# Patient Record
Sex: MALE | Race: WHITE | NOT HISPANIC OR LATINO | Employment: FULL TIME | URBAN - METROPOLITAN AREA
[De-identification: names, ages, dates, MRNs, and addresses within clinical notes are randomized per-mention and may not be internally consistent; named-entity substitution may affect disease eponyms.]

---

## 2017-05-23 ENCOUNTER — NON-PROVIDER VISIT (OUTPATIENT)
Dept: OCCUPATIONAL MEDICINE | Facility: CLINIC | Age: 27
End: 2017-05-23

## 2017-05-23 DIAGNOSIS — Z02.1 PRE-EMPLOYMENT DRUG SCREENING: ICD-10-CM

## 2017-05-23 LAB
AMP AMPHETAMINE: NORMAL
COC COCAINE: NORMAL
INT CON NEG: NORMAL
INT CON POS: NORMAL
MET METHAMPHETAMINES: NORMAL
OPI OPIATES: NORMAL
PCP PHENCYCLIDINE: NORMAL
POC DRUG COMMENT 753798-OCCUPATIONAL HEALTH: NORMAL
THC: NORMAL

## 2017-05-23 PROCEDURE — 80305 DRUG TEST PRSMV DIR OPT OBS: CPT | Performed by: PREVENTIVE MEDICINE

## 2017-05-23 NOTE — MR AVS SNAPSHOT
Delfino Swartz   2017 3:50 PM   Non-Provider Visit   MRN: [id number]    Department:  [facility]   Dept Phone:  [phone]    Description:  Male : 1990   Provider:  VIDHYA HALEY MA           Reason for Visit     Drug / Alcohol Assessment [email or web address] Pre-employment DS       Allergies as of 2017     No Known Allergies      You were diagnosed with     Pre-employment drug screening   [970228]         Basic Information     Date Of Birth Sex Race Ethnicity Preferred Language    1990 Male White Non- English      Health Maintenance        Date Due Completion Dates    IMM HEP B VACCINE (1 of 3 - Primary Series) 1990 ---    IMM HEP A VACCINE (1 of 2 - Standard Series) 10/16/1993 ---    IMM HPV VACCINE (1 of 3 - Male 3 Dose Series) 10/16/2003 ---    IMM VARICELLA (CHICKENPOX) VACCINE (1 of 2 - 2 Dose Adolescent Series) 10/16/2005 ---    IMM DTaP/Tdap/Td Vaccine (1 - Tdap) 10/16/2011 ---            Results     POCT 6 Panel Urine Drug Screen      Component    AMPHETAMINE    POC THC    COCAINE    OPIATES    PHENCYCLIDINE    METHAMPHETAMINES    POC Urine Drug Screen Comment    NEG    Internal Control Positive    Valid    Internal Control Negative    Valid                        Current Immunizations     No immunizations on file.      Below and/or attached are the medications your provider expects you to take. Review all of your home medications and newly ordered medications with your provider and/or pharmacist. Follow medication instructions as directed by your provider and/or pharmacist. Please keep your medication list with you and share with your provider. Update the information when medications are discontinued, doses are changed, or new medications (including over-the-counter products) are added; and carry medication information at all times in the event of emergency situations     Allergies:  No Known Allergies          Medications  Valid as of: May 23, 2017 -  4:18 PM    Generic Name Brand Name Tablet Size Instructions for use    .                 Medicines prescribed today were sent to:     None      Medication refill instructions:       If your prescription bottle indicates you have medication refills left, it is not necessary to call your provider’s office. Please contact your pharmacy and they will refill your medication.    If your prescription bottle indicates you do not have any refills left, you may request refills at any time through one of the following ways: The online [employer] system (except Urgent Care), by calling your provider’s office, or by asking your pharmacy to contact your provider’s office with a refill request. Medication refills are processed only during regular business hours and may not be available until the next business day. Your provider may request additional information or to have a follow-up visit with you prior to refilling your medication.   *Please Note: Medication refills are assigned a new Rx number when refilled electronically. Your pharmacy may indicate that no refills were authorized even though a new prescription for the same medication is available at the pharmacy. Please request the medicine by name with the pharmacy before contacting your provider for a refill.           MyChart Status: Patient Declined

## 2021-06-09 ENCOUNTER — APPOINTMENT (OUTPATIENT)
Dept: URGENT CARE | Facility: PHYSICIAN GROUP | Age: 31
End: 2021-06-09

## 2021-08-12 ENCOUNTER — NON-PROVIDER VISIT (OUTPATIENT)
Dept: URGENT CARE | Facility: PHYSICIAN GROUP | Age: 31
End: 2021-08-12

## 2021-08-12 DIAGNOSIS — Z02.1 PRE-EMPLOYMENT DRUG SCREENING: ICD-10-CM

## 2021-08-12 PROCEDURE — 80305 DRUG TEST PRSMV DIR OPT OBS: CPT | Performed by: PHYSICIAN ASSISTANT

## 2021-11-10 ENCOUNTER — APPOINTMENT (OUTPATIENT)
Dept: URGENT CARE | Facility: PHYSICIAN GROUP | Age: 31
End: 2021-11-10

## 2021-12-13 ENCOUNTER — APPOINTMENT (OUTPATIENT)
Dept: MEDICAL GROUP | Facility: PHYSICIAN GROUP | Age: 31
End: 2021-12-13

## 2023-03-23 ENCOUNTER — OFFICE VISIT (OUTPATIENT)
Dept: URGENT CARE | Facility: PHYSICIAN GROUP | Age: 33
End: 2023-03-23
Payer: COMMERCIAL

## 2023-03-23 VITALS
SYSTOLIC BLOOD PRESSURE: 118 MMHG | RESPIRATION RATE: 17 BRPM | BODY MASS INDEX: 25.06 KG/M2 | WEIGHT: 185 LBS | DIASTOLIC BLOOD PRESSURE: 62 MMHG | HEIGHT: 72 IN | TEMPERATURE: 98.1 F | OXYGEN SATURATION: 98 % | HEART RATE: 68 BPM

## 2023-03-23 DIAGNOSIS — H61.21 HEARING LOSS SECONDARY TO CERUMEN IMPACTION, RIGHT: ICD-10-CM

## 2023-03-23 DIAGNOSIS — H61.21 IMPACTED CERUMEN OF RIGHT EAR: ICD-10-CM

## 2023-03-23 PROCEDURE — 99203 OFFICE O/P NEW LOW 30 MIN: CPT | Performed by: REGISTERED NURSE

## 2023-03-23 ASSESSMENT — ENCOUNTER SYMPTOMS
CHILLS: 0
HEADACHES: 0
DIZZINESS: 0
FEVER: 0
NECK PAIN: 0

## 2023-03-24 NOTE — PROGRESS NOTES
Chief Complaint   Patient presents with    Ear Fullness     R ear, too far in ear with q tip, x5 days     HPI:   Patient is a 32 y.o. male who is presenting for 5 days of muffled hearing with right ear, reports he was cleaning the ear out 5 days ago with a Q-tip and had some discomfort and since then has been unable to fully hear.  Not endorsing any pain at this time.  Denies history of cerumen impactions.  No upper respiratory symptoms.  There is no ear drainage.  Immunizations current.    No Known Allergies     Pertinent history: non    Social History     Tobacco Use    Smoking status: Never    Smokeless tobacco: Never   Substance Use Topics    Alcohol use: Never     Review of Systems   Constitutional:  Negative for chills and fever.   HENT:  Negative for ear discharge and ear pain.    Musculoskeletal:  Negative for neck pain.   Neurological:  Negative for dizziness and headaches.      Vitals:    03/23/23 1754   BP: 118/62   Pulse: 68   Resp: 17   Temp: 36.7 °C (98.1 °F)   SpO2: 98%     Physical Exam  Constitutional:       Appearance: He is not ill-appearing.   HENT:      Right Ear: External ear normal. There is impacted cerumen (unable to visualize TM). No mastoid tenderness.      Left Ear: Hearing and tympanic membrane normal. There is no impacted cerumen.      Ears:      Comments: Right ear: No pain with manipulation of ear, no tragal tenderness, no purulent or serosanguineous drainage in canal     Nose: No congestion.      Mouth/Throat:      Mouth: Mucous membranes are moist.      Pharynx: Oropharynx is clear.   Eyes:      Extraocular Movements: Extraocular movements intact.      Conjunctiva/sclera: Conjunctivae normal.      Pupils: Pupils are equal, round, and reactive to light.   Skin:     Capillary Refill: Capillary refill takes less than 2 seconds.   Neurological:      General: No focal deficit present.      Mental Status: He is alert.      Cranial Nerves: No cranial nerve deficit.     Assessment/Plan:  1.  Impacted cerumen of right ear        2. Hearing loss secondary to cerumen impaction, right        Vital signs stable. There is no signs of external infection, no pain with manipulation of right ear, no tragal tenderness. We did soak the right ear with Debrox and attempt to irrigate, unsuccessful removal of cerumen from ear canal.  Patient instructed to use OTC Debrox drops per  recommendations, if this does not successfully remove cerumen then he can return and we can attempt to reirrigate. Monitor symptoms.    Return to clinic or go to ED if symptoms worsen or persist. Indications for ED discussed at length. Patient/Parent/Guardian voices understanding. Follow-up with your primary care provider in 3-5 days. Red flag symptoms discussed. All side effects of medication discussed including allergic response, GI upset, tendon injury, rash, sedation etc.    Please note that this dictation was created using voice recognition software. I have made every reasonable attempt to correct obvious errors, but I expect that there are errors of grammar and possibly content that I did not discover before finalizing the note.

## 2023-06-27 ENCOUNTER — APPOINTMENT (OUTPATIENT)
Dept: RADIOLOGY | Facility: IMAGING CENTER | Age: 33
End: 2023-06-27
Attending: PHYSICIAN ASSISTANT
Payer: COMMERCIAL

## 2023-06-27 ENCOUNTER — OFFICE VISIT (OUTPATIENT)
Dept: URGENT CARE | Facility: PHYSICIAN GROUP | Age: 33
End: 2023-06-27
Payer: COMMERCIAL

## 2023-06-27 VITALS
OXYGEN SATURATION: 97 % | BODY MASS INDEX: 24.54 KG/M2 | HEIGHT: 72 IN | RESPIRATION RATE: 16 BRPM | TEMPERATURE: 97.4 F | HEART RATE: 72 BPM | WEIGHT: 181.2 LBS | SYSTOLIC BLOOD PRESSURE: 112 MMHG | DIASTOLIC BLOOD PRESSURE: 68 MMHG

## 2023-06-27 DIAGNOSIS — M25.561 ACUTE PAIN OF RIGHT KNEE: ICD-10-CM

## 2023-06-27 DIAGNOSIS — S83.91XA SPRAIN OF RIGHT KNEE, UNSPECIFIED LIGAMENT, INITIAL ENCOUNTER: ICD-10-CM

## 2023-06-27 PROCEDURE — 3074F SYST BP LT 130 MM HG: CPT | Performed by: PHYSICIAN ASSISTANT

## 2023-06-27 PROCEDURE — 73564 X-RAY EXAM KNEE 4 OR MORE: CPT | Mod: TC,RT | Performed by: RADIOLOGY

## 2023-06-27 PROCEDURE — 99214 OFFICE O/P EST MOD 30 MIN: CPT | Performed by: PHYSICIAN ASSISTANT

## 2023-06-27 PROCEDURE — 3078F DIAST BP <80 MM HG: CPT | Performed by: PHYSICIAN ASSISTANT

## 2023-06-27 RX ORDER — NAPROXEN 500 MG/1
500 TABLET ORAL 2 TIMES DAILY WITH MEALS
Qty: 30 TABLET | Refills: 0 | Status: SHIPPED | OUTPATIENT
Start: 2023-06-27

## 2023-06-27 ASSESSMENT — ENCOUNTER SYMPTOMS
NAUSEA: 0
FEVER: 0
VOMITING: 0
CHILLS: 0
MYALGIAS: 1

## 2023-06-27 NOTE — PROGRESS NOTES
Subjective:   Delfino Swartz is a 32 y.o. male who presents for Knee Pain (R knee injury, swelling, pt was walking when he heard a loud pop on R knee, onset last night )        Patient presents with concerns of right knee pain that began last night.  Patient states that he was walking and he felt the knee twisted inward.  He heard a pop when this occurred.  Since then he has had difficulty bearing weight/pain with weightbearing.  He did not see his kneecap move.  No clicking or catching.  He denies current sensation of instability.  No nausea, vomiting, fevers, chills.      Review of Systems   Constitutional:  Negative for chills and fever.   Gastrointestinal:  Negative for nausea and vomiting.   Musculoskeletal:  Positive for joint pain and myalgias.       PMH:  has no past medical history on file.  MEDS:   Current Outpatient Medications:     naproxen (NAPROSYN) 500 MG Tab, Take 1 Tablet by mouth 2 times a day with meals., Disp: 30 Tablet, Rfl: 0  ALLERGIES: No Known Allergies  SURGHX:   Past Surgical History:   Procedure Laterality Date    KNEE ARTHROSCOPY Left     2021 at [facility] per patient     SOCHX:  reports that he has never smoked. He has never used smokeless tobacco. He reports current alcohol use.  FH: Family history was reviewed, no pertinent findings to report   Objective:   /68 (BP Location: Right arm, Patient Position: Sitting, BP Cuff Size: Adult)   Pulse 72   Temp 36.3 °C (97.4 °F) (Temporal)   Resp 16   Ht 1.829 m (6')   Wt 82.2 kg (181 lb 3.2 oz)   SpO2 97%   BMI 24.58 kg/m²   Physical Exam  Vitals reviewed.   Constitutional:       General: He is not in acute distress.     Appearance: Normal appearance. He is well-developed. He is not toxic-appearing.   HENT:      Head: Normocephalic and atraumatic.      Right Ear: External ear normal.      Left Ear: External ear normal.      Nose: Nose normal.   Cardiovascular:      Rate and Rhythm: Normal rate and regular rhythm.   Pulmonary:       Effort: Pulmonary effort is normal. No respiratory distress.      Breath sounds: No stridor.   Musculoskeletal:      Comments: Right knee: Skin is intact and atraumatic.  No gross deformities.  Flexion to 70 and extension to 0.  Tender to palpation over proximal patella and quadriceps tendon.  Nontender to palpation over patellar tendon, medial joint line, lateral joint line, hamstring tendons.  No palpable effusion.  Patient does not tolerate varus or valgus stresses and specialized testing.   Skin:     General: Skin is dry.   Neurological:      Comments: Alert and oriented.    Psychiatric:         Speech: Speech normal.         Behavior: Behavior normal.         Imaging:     FINDINGS:     There is normal bony mineralization.  There is no evidence of fracture, dislocation, or osseous lesion.  There is no evidence of soft tissue injury.     IMPRESSION:        1.  No radiographic evidence of acute injury.    Assessment/Plan:   1. Acute pain of right knee  - DX-KNEE COMPLETE 4+ RIGHT; Future  - naproxen (NAPROSYN) 500 MG Tab; Take 1 Tablet by mouth 2 times a day with meals.  Dispense: 30 Tablet; Refill: 0    2. Sprain of right knee, unspecified ligament, initial encounter  - Referral to Orthopedics  - naproxen (NAPROSYN) 500 MG Tab; Take 1 Tablet by mouth 2 times a day with meals.  Dispense: 30 Tablet; Refill: 0    Patient records reviewed.  Patient was seen at [facility] on 8/11/2022 for right knee pain and mechanical locking of the knee.  He ultimately had an MRI of the right knee but I am unable to review results.      No evidence of acute bony injury on imaging today.  My exam is very limited today as patient does not tolerate varus and valgus stresses and specialized testing. Difficult to assess for ligamentous injury/internal derangement.  Patient fitted with hinged knee brace and crutches.  I would like him to follow-up with [facility] in ~7 days for reevaluation.  Referral placed and he will contact [facility] directly  tomorrow to schedule follow-up.  Elevate, ice as needed.  Avoid aggravating activities.  NSAIDs as needed for pain. Return precautions reviewed.

## 2023-06-29 ENCOUNTER — OFFICE VISIT (OUTPATIENT)
Dept: URGENT CARE | Facility: PHYSICIAN GROUP | Age: 33
End: 2023-06-29
Payer: COMMERCIAL

## 2023-06-29 VITALS
TEMPERATURE: 99.1 F | HEART RATE: 70 BPM | SYSTOLIC BLOOD PRESSURE: 122 MMHG | WEIGHT: 181 LBS | HEIGHT: 72 IN | RESPIRATION RATE: 14 BRPM | OXYGEN SATURATION: 98 % | BODY MASS INDEX: 24.52 KG/M2 | DIASTOLIC BLOOD PRESSURE: 60 MMHG

## 2023-06-29 DIAGNOSIS — S86.911A STRAIN OF RIGHT KNEE, INITIAL ENCOUNTER: ICD-10-CM

## 2023-06-29 PROCEDURE — 3078F DIAST BP <80 MM HG: CPT | Performed by: NURSE PRACTITIONER

## 2023-06-29 PROCEDURE — 99213 OFFICE O/P EST LOW 20 MIN: CPT | Performed by: NURSE PRACTITIONER

## 2023-06-29 PROCEDURE — 3074F SYST BP LT 130 MM HG: CPT | Performed by: NURSE PRACTITIONER

## 2023-06-29 NOTE — PROGRESS NOTES
Delfino Swartz is a 32 y.o. male who presents for Letter for School/Work (Go back to work tomorrow )      HPI  This is a new problem. Delfino Swartz is a 32 y.o. patient who presents to urgent care with c/o: Patient was seen in urgent care on 06/27/23 for right knee pain.  He was walking any felt like his knee twisted inward.  He heard a loud pop at the time.  He is had been having a difficult time bearing any weight.  That is improved.  He is wearing the knee brace that was given to him which has helped significantly.  He uses crutches as needed.  He does not hear any clicking when he tries to move his leg.  It does not catch or stop him from moving it.  It is only painful when he is moving it.  He denies sensation of instability of his knee.  He is here today requesting a note for his work.  He was previously given a note that gave him off work until today.  The note did not specifically say that he could return to work tomorrow and his employer is made him come back to get a different note.  No other aggravating or alleviating factors.       ROS See HPI    Allergies:     No Known Allergies    PMSFS Hx:  History reviewed. No pertinent past medical history.  Past Surgical History:   Procedure Laterality Date    KNEE ARTHROSCOPY Left     2021 at [facility] per patient     History reviewed. No pertinent family history.  Social History     Tobacco Use    Smoking status: Never    Smokeless tobacco: Never   Substance Use Topics    Alcohol use: Yes     Comment: Occasional       Problems:   Patient Active Problem List   Diagnosis    Hearing loss secondary to cerumen impaction, right       Medications:   Current Outpatient Medications on File Prior to Visit   Medication Sig Dispense Refill    naproxen (NAPROSYN) 500 MG Tab Take 1 Tablet by mouth 2 times a day with meals. 30 Tablet 0     No current facility-administered medications on file prior to visit.          Objective:     /60 (BP Location: Right arm, Patient Position:  Sitting, BP Cuff Size: Adult)   Pulse 70   Temp 37.3 °C (99.1 °F) (Temporal)   Resp 14   Ht 1.829 m (6')   Wt 82.1 kg (181 lb)   SpO2 98%   BMI 24.55 kg/m²     Physical Exam  Vitals and nursing note reviewed.   Constitutional:       Appearance: Normal appearance. He is normal weight.   Cardiovascular:      Rate and Rhythm: Normal rate.   Pulmonary:      Effort: Pulmonary effort is normal.   Musculoskeletal:      Comments: Deferred exam.  Patient is using a hinged knee brace.  Reports that it is very effective.  He is able to ambulate without his crutches without any difficulty while wearing his brace.   Skin:     General: Skin is warm.      Capillary Refill: Capillary refill takes less than 2 seconds.   Neurological:      Mental Status: He is alert and oriented to person, place, and time.   Psychiatric:         Mood and Affect: Mood normal.         Behavior: Behavior normal.         Thought Content: Thought content normal.           Assessment /Associated Orders:      1. Strain of right knee, initial encounter                Medical Decision Making:    Pt is clinically stable at today's acute urgent care visit.  No acute distress noted. Appropriate for outpatient care at this time.   Acute problem today with uncertain prognosis.   Work note provided to the patient.  He may return to work with full duty and wearing his knee brace.  He can use the crutches as needed as needed as tolerated.  Crutches are not required at this time.  He should follow-up with orthopedics as scheduled.  He reports he has an appointment already scheduled.  Resume all prior  RX medications. Take as prescribed.   Ice pack prn pain   OTC acetaminophen for breakthrough pain. Dosage and directions per . Do not exceed 3000 mg in 24 hours.               Please note that this dictation was created using voice recognition software. I have worked with consultants from the vendor as well as technical experts from [employer] to  optimize the interface. I have made every reasonable attempt to correct obvious errors, but I expect that there are errors of grammar and possibly content that I did not discover before finalizing the note.  This note was electronically signed by provider

## 2023-06-29 NOTE — LETTER
June 29, 2023       Patient: Delfino Swartz   YOB: 1990   Date of Visit: 6/29/2023         To Whom It May Concern:    In my medical opinion, I recommend that Delfino Swartz return to full duty, no restrictions on 06/30/23.             Sincerely,          BATOOL Veras  Electronically Signed

## 2024-04-09 ENCOUNTER — HOSPITAL ENCOUNTER (INPATIENT)
Facility: MEDICAL CENTER | Age: 34
LOS: 1 days | DRG: 880 | End: 2024-04-10
Attending: EMERGENCY MEDICINE | Admitting: INTERNAL MEDICINE
Payer: COMMERCIAL

## 2024-04-09 ENCOUNTER — APPOINTMENT (OUTPATIENT)
Dept: RADIOLOGY | Facility: MEDICAL CENTER | Age: 34
DRG: 880 | End: 2024-04-09
Attending: INTERNAL MEDICINE
Payer: COMMERCIAL

## 2024-04-09 ENCOUNTER — APPOINTMENT (OUTPATIENT)
Dept: RADIOLOGY | Facility: MEDICAL CENTER | Age: 34
DRG: 880 | End: 2024-04-09
Payer: COMMERCIAL

## 2024-04-09 ENCOUNTER — APPOINTMENT (OUTPATIENT)
Dept: RADIOLOGY | Facility: MEDICAL CENTER | Age: 34
DRG: 880 | End: 2024-04-09
Attending: EMERGENCY MEDICINE
Payer: COMMERCIAL

## 2024-04-09 DIAGNOSIS — F44.7 FUNCTIONAL NEUROLOGICAL SYMPTOM DISORDER WITH MIXED SYMPTOMS: ICD-10-CM

## 2024-04-09 DIAGNOSIS — R20.0 NUMBNESS: ICD-10-CM

## 2024-04-09 DIAGNOSIS — R53.1 WEAKNESS: ICD-10-CM

## 2024-04-09 PROBLEM — R74.8 ALKALINE PHOSPHATASE ELEVATION: Status: ACTIVE | Noted: 2024-04-09

## 2024-04-09 PROBLEM — R29.898 COMPLAINTS OF WEAKNESS OF LOWER EXTREMITY: Status: ACTIVE | Noted: 2024-04-09

## 2024-04-09 LAB
ALBUMIN SERPL BCP-MCNC: 4.8 G/DL (ref 3.2–4.9)
ALBUMIN/GLOB SERPL: 1.6 G/DL
ALP SERPL-CCNC: 101 U/L (ref 30–99)
ALT SERPL-CCNC: 13 U/L (ref 2–50)
AMPHET UR QL SCN: NEGATIVE
ANION GAP SERPL CALC-SCNC: 12 MMOL/L (ref 7–16)
AST SERPL-CCNC: 23 U/L (ref 12–45)
BARBITURATES UR QL SCN: NEGATIVE
BASOPHILS # BLD AUTO: 0.3 % (ref 0–1.8)
BASOPHILS # BLD: 0.02 K/UL (ref 0–0.12)
BENZODIAZ UR QL SCN: NEGATIVE
BILIRUB SERPL-MCNC: 0.4 MG/DL (ref 0.1–1.5)
BUN SERPL-MCNC: 13 MG/DL (ref 8–22)
BZE UR QL SCN: NEGATIVE
CALCIUM ALBUM COR SERPL-MCNC: 8.7 MG/DL (ref 8.5–10.5)
CALCIUM SERPL-MCNC: 9.3 MG/DL (ref 8.5–10.5)
CANNABINOIDS UR QL SCN: NEGATIVE
CHLORIDE SERPL-SCNC: 104 MMOL/L (ref 96–112)
CK SERPL-CCNC: 196 U/L (ref 0–154)
CO2 SERPL-SCNC: 23 MMOL/L (ref 20–33)
CREAT SERPL-MCNC: 0.7 MG/DL (ref 0.5–1.4)
CRP SERPL HS-MCNC: <0.3 MG/DL (ref 0–0.75)
D DIMER PPP IA.FEU-MCNC: <0.27 UG/ML (FEU) (ref 0–0.5)
EKG IMPRESSION: NORMAL
EOSINOPHIL # BLD AUTO: 0.28 K/UL (ref 0–0.51)
EOSINOPHIL NFR BLD: 4.4 % (ref 0–6.9)
ERYTHROCYTE [DISTWIDTH] IN BLOOD BY AUTOMATED COUNT: 38.1 FL (ref 35.9–50)
ERYTHROCYTE [SEDIMENTATION RATE] IN BLOOD BY WESTERGREN METHOD: 2 MM/HOUR (ref 0–20)
EST. AVERAGE GLUCOSE BLD GHB EST-MCNC: 91 MG/DL
FENTANYL UR QL: NEGATIVE
GFR SERPLBLD CREATININE-BSD FMLA CKD-EPI: 126 ML/MIN/1.73 M 2
GLOBULIN SER CALC-MCNC: 3 G/DL (ref 1.9–3.5)
GLUCOSE BLD STRIP.AUTO-MCNC: 91 MG/DL (ref 65–99)
GLUCOSE SERPL-MCNC: 105 MG/DL (ref 65–99)
HBA1C MFR BLD: 4.8 % (ref 4–5.6)
HCT VFR BLD AUTO: 48.7 % (ref 42–52)
HGB BLD-MCNC: 17.1 G/DL (ref 14–18)
IMM GRANULOCYTES # BLD AUTO: 0.01 K/UL (ref 0–0.11)
IMM GRANULOCYTES NFR BLD AUTO: 0.2 % (ref 0–0.9)
LYMPHOCYTES # BLD AUTO: 1.45 K/UL (ref 1–4.8)
LYMPHOCYTES NFR BLD: 22.7 % (ref 22–41)
MAGNESIUM SERPL-MCNC: 2.2 MG/DL (ref 1.5–2.5)
MCH RBC QN AUTO: 30.1 PG (ref 27–33)
MCHC RBC AUTO-ENTMCNC: 35.1 G/DL (ref 32.3–36.5)
MCV RBC AUTO: 85.7 FL (ref 81.4–97.8)
METHADONE UR QL SCN: NEGATIVE
MONOCYTES # BLD AUTO: 0.49 K/UL (ref 0–0.85)
MONOCYTES NFR BLD AUTO: 7.7 % (ref 0–13.4)
NEUTROPHILS # BLD AUTO: 4.14 K/UL (ref 1.82–7.42)
NEUTROPHILS NFR BLD: 64.7 % (ref 44–72)
NRBC # BLD AUTO: 0 K/UL
NRBC BLD-RTO: 0 /100 WBC (ref 0–0.2)
OPIATES UR QL SCN: NEGATIVE
OXYCODONE UR QL SCN: NEGATIVE
PCP UR QL SCN: NEGATIVE
PLATELET # BLD AUTO: 255 K/UL (ref 164–446)
PMV BLD AUTO: 10.5 FL (ref 9–12.9)
POTASSIUM SERPL-SCNC: 3.8 MMOL/L (ref 3.6–5.5)
PROCALCITONIN SERPL-MCNC: <0.05 NG/ML
PROCALCITONIN SERPL-MCNC: <0.05 NG/ML
PROPOXYPH UR QL SCN: NEGATIVE
PROT SERPL-MCNC: 7.8 G/DL (ref 6–8.2)
RBC # BLD AUTO: 5.68 M/UL (ref 4.7–6.1)
SODIUM SERPL-SCNC: 139 MMOL/L (ref 135–145)
TROPONIN T SERPL-MCNC: <6 NG/L (ref 6–19)
TSH SERPL DL<=0.005 MIU/L-ACNC: 1.42 UIU/ML (ref 0.38–5.33)
VIT B12 SERPL-MCNC: 810 PG/ML (ref 211–911)
WBC # BLD AUTO: 6.4 K/UL (ref 4.8–10.8)

## 2024-04-09 PROCEDURE — 93005 ELECTROCARDIOGRAM TRACING: CPT | Performed by: INTERNAL MEDICINE

## 2024-04-09 PROCEDURE — 700111 HCHG RX REV CODE 636 W/ 250 OVERRIDE (IP): Performed by: EMERGENCY MEDICINE

## 2024-04-09 PROCEDURE — 82550 ASSAY OF CK (CPK): CPT

## 2024-04-09 PROCEDURE — 84443 ASSAY THYROID STIM HORMONE: CPT

## 2024-04-09 PROCEDURE — 70450 CT HEAD/BRAIN W/O DYE: CPT

## 2024-04-09 PROCEDURE — 72148 MRI LUMBAR SPINE W/O DYE: CPT

## 2024-04-09 PROCEDURE — 36415 COLL VENOUS BLD VENIPUNCTURE: CPT

## 2024-04-09 PROCEDURE — 85025 COMPLETE CBC W/AUTO DIFF WBC: CPT

## 2024-04-09 PROCEDURE — 83735 ASSAY OF MAGNESIUM: CPT

## 2024-04-09 PROCEDURE — 99222 1ST HOSP IP/OBS MODERATE 55: CPT

## 2024-04-09 PROCEDURE — 84484 ASSAY OF TROPONIN QUANT: CPT

## 2024-04-09 PROCEDURE — 99285 EMERGENCY DEPT VISIT HI MDM: CPT

## 2024-04-09 PROCEDURE — 96375 TX/PRO/DX INJ NEW DRUG ADDON: CPT

## 2024-04-09 PROCEDURE — 85652 RBC SED RATE AUTOMATED: CPT

## 2024-04-09 PROCEDURE — 72146 MRI CHEST SPINE W/O DYE: CPT

## 2024-04-09 PROCEDURE — 80053 COMPREHEN METABOLIC PANEL: CPT

## 2024-04-09 PROCEDURE — 72141 MRI NECK SPINE W/O DYE: CPT

## 2024-04-09 PROCEDURE — 93005 ELECTROCARDIOGRAM TRACING: CPT

## 2024-04-09 PROCEDURE — 86140 C-REACTIVE PROTEIN: CPT

## 2024-04-09 PROCEDURE — 93005 ELECTROCARDIOGRAM TRACING: CPT | Performed by: EMERGENCY MEDICINE

## 2024-04-09 PROCEDURE — A9270 NON-COVERED ITEM OR SERVICE: HCPCS | Performed by: INTERNAL MEDICINE

## 2024-04-09 PROCEDURE — 700102 HCHG RX REV CODE 250 W/ 637 OVERRIDE(OP): Performed by: INTERNAL MEDICINE

## 2024-04-09 PROCEDURE — 82607 VITAMIN B-12: CPT

## 2024-04-09 PROCEDURE — 85379 FIBRIN DEGRADATION QUANT: CPT

## 2024-04-09 PROCEDURE — 700105 HCHG RX REV CODE 258: Performed by: INTERNAL MEDICINE

## 2024-04-09 PROCEDURE — 71045 X-RAY EXAM CHEST 1 VIEW: CPT

## 2024-04-09 PROCEDURE — 84145 PROCALCITONIN (PCT): CPT

## 2024-04-09 PROCEDURE — 99222 1ST HOSP IP/OBS MODERATE 55: CPT | Performed by: PSYCHIATRY & NEUROLOGY

## 2024-04-09 PROCEDURE — 70551 MRI BRAIN STEM W/O DYE: CPT

## 2024-04-09 PROCEDURE — 87040 BLOOD CULTURE FOR BACTERIA: CPT | Mod: 91

## 2024-04-09 PROCEDURE — 80307 DRUG TEST PRSMV CHEM ANLYZR: CPT

## 2024-04-09 PROCEDURE — 82962 GLUCOSE BLOOD TEST: CPT

## 2024-04-09 PROCEDURE — 770020 HCHG ROOM/CARE - TELE (206)

## 2024-04-09 PROCEDURE — 99222 1ST HOSP IP/OBS MODERATE 55: CPT | Performed by: INTERNAL MEDICINE

## 2024-04-09 PROCEDURE — 83036 HEMOGLOBIN GLYCOSYLATED A1C: CPT

## 2024-04-09 PROCEDURE — 96374 THER/PROPH/DIAG INJ IV PUSH: CPT

## 2024-04-09 RX ORDER — OXYCODONE HYDROCHLORIDE 5 MG/1
2.5 TABLET ORAL
Status: DISCONTINUED | OUTPATIENT
Start: 2024-04-09 | End: 2024-04-10 | Stop reason: HOSPADM

## 2024-04-09 RX ORDER — PROMETHAZINE HYDROCHLORIDE 25 MG/1
12.5-25 SUPPOSITORY RECTAL EVERY 4 HOURS PRN
Status: DISCONTINUED | OUTPATIENT
Start: 2024-04-09 | End: 2024-04-10 | Stop reason: HOSPADM

## 2024-04-09 RX ORDER — ONDANSETRON 2 MG/ML
4 INJECTION INTRAMUSCULAR; INTRAVENOUS EVERY 4 HOURS PRN
Status: DISCONTINUED | OUTPATIENT
Start: 2024-04-09 | End: 2024-04-10 | Stop reason: HOSPADM

## 2024-04-09 RX ORDER — ONDANSETRON 2 MG/ML
4 INJECTION INTRAMUSCULAR; INTRAVENOUS ONCE
Status: COMPLETED | OUTPATIENT
Start: 2024-04-09 | End: 2024-04-09

## 2024-04-09 RX ORDER — PROMETHAZINE HYDROCHLORIDE 25 MG/1
12.5-25 TABLET ORAL EVERY 4 HOURS PRN
Status: DISCONTINUED | OUTPATIENT
Start: 2024-04-09 | End: 2024-04-10 | Stop reason: HOSPADM

## 2024-04-09 RX ORDER — HYDRALAZINE HYDROCHLORIDE 20 MG/ML
10 INJECTION INTRAMUSCULAR; INTRAVENOUS EVERY 4 HOURS PRN
Status: DISCONTINUED | OUTPATIENT
Start: 2024-04-09 | End: 2024-04-10 | Stop reason: HOSPADM

## 2024-04-09 RX ORDER — MORPHINE SULFATE 4 MG/ML
2 INJECTION INTRAVENOUS
Status: DISCONTINUED | OUTPATIENT
Start: 2024-04-09 | End: 2024-04-10 | Stop reason: HOSPADM

## 2024-04-09 RX ORDER — PROCHLORPERAZINE EDISYLATE 5 MG/ML
5-10 INJECTION INTRAMUSCULAR; INTRAVENOUS EVERY 4 HOURS PRN
Status: DISCONTINUED | OUTPATIENT
Start: 2024-04-09 | End: 2024-04-10 | Stop reason: HOSPADM

## 2024-04-09 RX ORDER — POLYETHYLENE GLYCOL 3350 17 G/17G
1 POWDER, FOR SOLUTION ORAL
Status: DISCONTINUED | OUTPATIENT
Start: 2024-04-09 | End: 2024-04-09

## 2024-04-09 RX ORDER — IBUPROFEN 200 MG
200 TABLET ORAL EVERY 6 HOURS PRN
COMMUNITY

## 2024-04-09 RX ORDER — POLYETHYLENE GLYCOL 3350 17 G/17G
1 POWDER, FOR SOLUTION ORAL
Status: DISCONTINUED | OUTPATIENT
Start: 2024-04-09 | End: 2024-04-10 | Stop reason: HOSPADM

## 2024-04-09 RX ORDER — MORPHINE SULFATE 4 MG/ML
4 INJECTION INTRAVENOUS ONCE
Status: COMPLETED | OUTPATIENT
Start: 2024-04-09 | End: 2024-04-09

## 2024-04-09 RX ORDER — SODIUM CHLORIDE, SODIUM LACTATE, POTASSIUM CHLORIDE, CALCIUM CHLORIDE 600; 310; 30; 20 MG/100ML; MG/100ML; MG/100ML; MG/100ML
INJECTION, SOLUTION INTRAVENOUS CONTINUOUS
Status: DISCONTINUED | OUTPATIENT
Start: 2024-04-09 | End: 2024-04-10 | Stop reason: HOSPADM

## 2024-04-09 RX ORDER — ONDANSETRON 4 MG/1
4 TABLET, ORALLY DISINTEGRATING ORAL EVERY 4 HOURS PRN
Status: DISCONTINUED | OUTPATIENT
Start: 2024-04-09 | End: 2024-04-10 | Stop reason: HOSPADM

## 2024-04-09 RX ORDER — AMOXICILLIN 250 MG
2 CAPSULE ORAL EVERY EVENING
Status: DISCONTINUED | OUTPATIENT
Start: 2024-04-09 | End: 2024-04-10 | Stop reason: HOSPADM

## 2024-04-09 RX ORDER — OXYCODONE HYDROCHLORIDE 5 MG/1
5 TABLET ORAL
Status: DISCONTINUED | OUTPATIENT
Start: 2024-04-09 | End: 2024-04-10 | Stop reason: HOSPADM

## 2024-04-09 RX ORDER — ACETAMINOPHEN 325 MG/1
650 TABLET ORAL EVERY 6 HOURS PRN
Status: DISCONTINUED | OUTPATIENT
Start: 2024-04-09 | End: 2024-04-10 | Stop reason: HOSPADM

## 2024-04-09 RX ORDER — AMOXICILLIN 250 MG
2 CAPSULE ORAL 2 TIMES DAILY
Status: DISCONTINUED | OUTPATIENT
Start: 2024-04-09 | End: 2024-04-09

## 2024-04-09 RX ADMIN — OXYCODONE 5 MG: 5 TABLET ORAL at 20:01

## 2024-04-09 RX ADMIN — SODIUM CHLORIDE, POTASSIUM CHLORIDE, SODIUM LACTATE AND CALCIUM CHLORIDE: 600; 310; 30; 20 INJECTION, SOLUTION INTRAVENOUS at 13:10

## 2024-04-09 RX ADMIN — MORPHINE SULFATE 4 MG: 4 INJECTION INTRAVENOUS at 12:20

## 2024-04-09 RX ADMIN — ACETAMINOPHEN 650 MG: 325 TABLET ORAL at 21:37

## 2024-04-09 RX ADMIN — ONDANSETRON 4 MG: 2 INJECTION INTRAMUSCULAR; INTRAVENOUS at 12:20

## 2024-04-09 ASSESSMENT — COGNITIVE AND FUNCTIONAL STATUS - GENERAL
DAILY ACTIVITIY SCORE: 24
SUGGESTED CMS G CODE MODIFIER MOBILITY: CH
MOBILITY SCORE: 24
SUGGESTED CMS G CODE MODIFIER DAILY ACTIVITY: CH

## 2024-04-09 ASSESSMENT — LIFESTYLE VARIABLES
AVERAGE NUMBER OF DAYS PER WEEK YOU HAVE A DRINK CONTAINING ALCOHOL: 0
HOW MANY TIMES IN THE PAST YEAR HAVE YOU HAD 5 OR MORE DRINKS IN A DAY: 0
EVER HAD A DRINK FIRST THING IN THE MORNING TO STEADY YOUR NERVES TO GET RID OF A HANGOVER: NO
ON A TYPICAL DAY WHEN YOU DRINK ALCOHOL HOW MANY DRINKS DO YOU HAVE: 0
CONSUMPTION TOTAL: NEGATIVE
ALCOHOL_USE: NO
DOES PATIENT WANT TO STOP DRINKING: NO
HAVE YOU EVER FELT YOU SHOULD CUT DOWN ON YOUR DRINKING: NO
TOTAL SCORE: 0
TOTAL SCORE: 0
HAVE PEOPLE ANNOYED YOU BY CRITICIZING YOUR DRINKING: NO
TOTAL SCORE: 0
EVER FELT BAD OR GUILTY ABOUT YOUR DRINKING: NO

## 2024-04-09 ASSESSMENT — ENCOUNTER SYMPTOMS
SEIZURES: 0
VOMITING: 0
HEARTBURN: 0
DIARRHEA: 0
DIZZINESS: 0
BLOOD IN STOOL: 0
WHEEZING: 0
PHOTOPHOBIA: 0
SPEECH CHANGE: 0
COUGH: 0
DOUBLE VISION: 1
PND: 0
FOCAL WEAKNESS: 1
CHILLS: 0
BLURRED VISION: 1
NERVOUS/ANXIOUS: 1
LOSS OF CONSCIOUSNESS: 0
SORE THROAT: 0
PALPITATIONS: 0
TREMORS: 0
SPUTUM PRODUCTION: 0
HEADACHES: 1
BACK PAIN: 1
CONSTIPATION: 0
ABDOMINAL PAIN: 0
SHORTNESS OF BREATH: 1
NAUSEA: 0
WEAKNESS: 1
SENSORY CHANGE: 1
FEVER: 0
SINUS PAIN: 0
TINGLING: 1

## 2024-04-09 ASSESSMENT — PATIENT HEALTH QUESTIONNAIRE - PHQ9
1. LITTLE INTEREST OR PLEASURE IN DOING THINGS: NOT AT ALL
SUM OF ALL RESPONSES TO PHQ9 QUESTIONS 1 AND 2: 0
2. FEELING DOWN, DEPRESSED, IRRITABLE, OR HOPELESS: NOT AT ALL

## 2024-04-09 ASSESSMENT — PAIN DESCRIPTION - PAIN TYPE
TYPE: ACUTE PAIN
TYPE: ACUTE PAIN

## 2024-04-09 ASSESSMENT — FIBROSIS 4 INDEX: FIB4 SCORE: 0.61

## 2024-04-09 NOTE — PROGRESS NOTES
Pt transported to T 1 by this writer from ED w/zoll in place w/o complication. Vitals obtained, WNL. Minimal pain in BLE noted, with pt refusing need for further pain medication at this time. No CP or SOB noted. Wife at bedside. MRI to be done, with transport calling for pt upon pt arrival to unit. Plan of care is ongoing.

## 2024-04-09 NOTE — ED PROVIDER NOTES
"ED Provider Note    CHIEF COMPLAINT  Chief Complaint   Patient presents with    Numbness     Patient c/o numbness to BLE and BUE onset at 1300 yesterday.     Joint Pain     Patient c/o joint pain to arms and legs with movement.     Dizziness     Onset at 1300 yesterday.     Low Back Pain     Onset at 1300 yesterday       EXTERNAL RECORDS REVIEWED  Other none    HPI/ROS  LIMITATION TO HISTORY   Select: : None    OUTSIDE HISTORIAN(S):  Significant other pt's wife at bedside    Delfino Swartz is a 33 y.o. male with no significant past medical history who presents for evaluation of weakness and numbness.    Patient states that 1 PM yesterday after eating lunch he noticed right upper and lower extremity numbness.  At 5 PM when he arrived home from work, he complained of his body feeling \"weighed down\" to his wife.  He felt that his legs were \"like cement.\"  He ate dinner and slept.  When he awoke at 6 AM, his left side was now involved.  He also reported mild headache which is generalized, blurred vision, and difficulty breathing.  He tried to take a shower but could not stand alone in the shower due to weakness in addition to pain in his joints.    When asked about which joints, the patient reported lower leg/calf pain but no ankle or knee pain.    Patient denies recent head trauma, recent viral illness, fever, chills, vomiting, diarrhea.    PAST MEDICAL HISTORY     Denies    SURGICAL HISTORY   has a past surgical history that includes knee arthroscopy (Left).    FAMILY HISTORY  History reviewed. No pertinent family history.    SOCIAL HISTORY  Social History     Tobacco Use    Smoking status: Never    Smokeless tobacco: Never   Vaping Use    Vaping Use: Never used   Substance and Sexual Activity    Alcohol use: Yes     Comment: Occasional    Drug use: Never    Sexual activity: Not on file       CURRENT MEDICATIONS  Home Medications       Reviewed by Berny Dent (Pharmacy Tech) on 04/09/24 at 1123  Med List " Status: Complete     Medication Last Dose Status   ibuprofen (MOTRIN) 200 MG Tab 4/8/2024 Active                    ALLERGIES  No Known Allergies    PHYSICAL EXAM  VITAL SIGNS: BP (!) 141/82   Pulse 65   Temp 36.6 °C (97.9 °F) (Temporal)   Resp 18   Wt 81.7 kg (180 lb 1.9 oz)   SpO2 98%   BMI 24.43 kg/m²    General:  WDWN male, nontoxic appearing in NAD; A+Ox3; V/S as above; afebrile  Skin: warm and dry; good color; no rash  HEENT: NCAT; EOMs intact; PERRL; no scleral icterus   Neck: FROM  Cardiovascular: Regular heart rate and rhythm.  No murmurs, rubs, or gallops; pulses 2+ bilaterally radially and DP areas  Lungs: No respiratory distress or tachypnea; Clear to auscultation with good air movement bilaterally.  No wheezes, rhonchi, or rales.   Abdomen: BS present; soft; NTND; no rebound, guarding, or rigidity.  No organomegaly or pulsatile mass  Extremities: no e/o trauma; no pedal edema; neg Boone's; no erythema or crepitus; no joint effusions or warmth  Neurologic: CNs III-XII grossly intact; speech clear; DTRs patellar 2+ on left, zero on right; BR 2+ bilaterally;  strength equal but reduced, 2 out of 5; patient unable to plantarflex and weak with dorsiflexion bilaterally/equally  Psychiatric: Appropriate affect, normal mood      EKG/LABS  Results for orders placed or performed during the hospital encounter of 04/09/24   CBC WITH DIFFERENTIAL   Result Value Ref Range    WBC 6.4 4.8 - 10.8 K/uL    RBC 5.68 4.70 - 6.10 M/uL    Hemoglobin 17.1 14.0 - 18.0 g/dL    Hematocrit 48.7 42.0 - 52.0 %    MCV 85.7 81.4 - 97.8 fL    MCH 30.1 27.0 - 33.0 pg    MCHC 35.1 32.3 - 36.5 g/dL    RDW 38.1 35.9 - 50.0 fL    Platelet Count 255 164 - 446 K/uL    MPV 10.5 9.0 - 12.9 fL    Neutrophils-Polys 64.70 44.00 - 72.00 %    Lymphocytes 22.70 22.00 - 41.00 %    Monocytes 7.70 0.00 - 13.40 %    Eosinophils 4.40 0.00 - 6.90 %    Basophils 0.30 0.00 - 1.80 %    Immature Granulocytes 0.20 0.00 - 0.90 %    Nucleated RBC 0.00  0.00 - 0.20 /100 WBC    Neutrophils (Absolute) 4.14 1.82 - 7.42 K/uL    Lymphs (Absolute) 1.45 1.00 - 4.80 K/uL    Monos (Absolute) 0.49 0.00 - 0.85 K/uL    Eos (Absolute) 0.28 0.00 - 0.51 K/uL    Baso (Absolute) 0.02 0.00 - 0.12 K/uL    Immature Granulocytes (abs) 0.01 0.00 - 0.11 K/uL    NRBC (Absolute) 0.00 K/uL   CMP   Result Value Ref Range    Sodium 139 135 - 145 mmol/L    Potassium 3.8 3.6 - 5.5 mmol/L    Chloride 104 96 - 112 mmol/L    Co2 23 20 - 33 mmol/L    Anion Gap 12.0 7.0 - 16.0    Glucose 105 (H) 65 - 99 mg/dL    Bun 13 8 - 22 mg/dL    Creatinine 0.70 0.50 - 1.40 mg/dL    Calcium 9.3 8.5 - 10.5 mg/dL    Correct Calcium 8.7 8.5 - 10.5 mg/dL    AST(SGOT) 23 12 - 45 U/L    ALT(SGPT) 13 2 - 50 U/L    Alkaline Phosphatase 101 (H) 30 - 99 U/L    Total Bilirubin 0.4 0.1 - 1.5 mg/dL    Albumin 4.8 3.2 - 4.9 g/dL    Total Protein 7.8 6.0 - 8.2 g/dL    Globulin 3.0 1.9 - 3.5 g/dL    A-G Ratio 1.6 g/dL   MAGNESIUM   Result Value Ref Range    Magnesium 2.2 1.5 - 2.5 mg/dL   ESTIMATED GFR   Result Value Ref Range    GFR (CKD-EPI) 126 >60 mL/min/1.73 m 2   EKG   Result Value Ref Range    Report       [facility] of the [facility] Emergency Dept.    Test Date:  2024  Pt Name:    SIVA DEL VALLE                 Department: ER  MRN:        [id number]                      Room:  Gender:     Male                         Technician: [id number]  :        1990                   Requested By:ER TRIAGE PROTOCOL  Order #:    [id number]                    Reading MD: CHING SOLARES MD    Measurements  Intervals                                Axis  Rate:       60                           P:          55  MO:         192                          QRS:        66  QRSD:       89                           T:          21  QT:         389  QTc:        389    Interpretive Statements  Sinus rhythm  No previous ECG available for comparison  Electronically Signed On 2024 10:54:57 PDT by CHING SOLARES MD          RADIOLOGY  I have independently interpreted the diagnostic imaging associated with this visit and am waiting the final reading from the radiologist.   My preliminary interpretation is as follows:   No intracranial hemorrhage seen on CT    Radiologist interpretation:  CT-HEAD W/O   Final Result      1.  Head CT without contrast within normal limits. No evidence of acute cerebral infarction, hemorrhage or mass lesion.   2.  Small right posterior frontal cortical calcification.      DX-CHEST-PORTABLE (1 VIEW)   Final Result      No acute cardiac or pulmonary abnormalities are identified.          COURSE & MEDICAL DECISION MAKING    ASSESSMENT, COURSE AND PLAN  Care Narrative: This is a 33-year-old previously healthy male who presents with neurologic symptoms that are bilateral and progressive, now reporting difficulty breathing and ataxia this morning.    Neurology paged for consult    CT head ordered.    RT consulted for NIF.    Consent for LP obtained.    Neurology/Jesus who agrees with admission, hold IVIG, lumbar puncture; he will come see the patient.    Paging intensivist for possible IMCU admission.    CT head negative.    11:21 AM  I discussed the case with the intensivist.  On his evaluation of the patient, for which I was in the room, the patient now reported severe pain in his bilateral lower extremities and was able to plantarflex with good strength.  Patient tearful due to pain.  CPK and morphine with Zofran ordered.  ICU attending feels patient is appropriate for floor and recommends spine imaging and additional labs which I am in agreement with.  Paging the hospitalist.    At this time, I will hold off on lumbar puncture given concern for a lumbar process per ICU intensivist's exam/evaluation.  This may need to be performed following MRI spine imaging.    11:32 AM  I spoke with Dr. Ortiz from the hospitalist service who agrees to admit the patient and will assess.    CRITICAL CARE  The very real  possibilty of a deterioration of this patient's condition required the highest level of my preparedness for sudden, emergent intervention.  I provided critical care services, which included medication orders, frequent reevaluations of the patient's condition and response to treatment, ordering and reviewing test results, and discussing the case with various consultants.  The critical care time associated with the care of the patient was 35 minutes. Review chart for interventions. This time is exclusive of any other billable procedures.         DISPOSITION AND DISCUSSIONS  I have discussed management of the patient with the following physicians and SHANTI's:    Dr. Lee from neurology  Neurology NP  Intensivist  Hospitalist    FINAL DIAGNOSIS  1. Weakness    2. Numbness        Electronically signed by: Nay Blum M.D., 4/9/2024 8:45 AM

## 2024-04-09 NOTE — CARE PLAN
The patient is Stable - Low risk of patient condition declining or worsening    Shift Goals  Clinical Goals: Monitor labs/vitals, pain management, neuro checks, MRI  Patient Goals: Pain management, get MRI done  Family Goals: Get MRI done and figure out whats wrong    Progress made toward(s) clinical / shift goals:        Problem: Pain - Standard  Goal: Alleviation of pain or a reduction in pain to the patient’s comfort goal  Outcome: Progressing     Problem: Psychosocial - Patient Condition  Goal: Patient's ability to verbalize feelings about condition will improve  Outcome: Progressing     Problem: Neuro Status  Goal: Neuro status will remain stable or improve  Outcome: Progressing     Problem: Knowledge Deficit - Standard  Goal: Patient and family/care givers will demonstrate understanding of plan of care, disease process/condition, diagnostic tests and medications  Outcome: Progressing     Pt vitals stable, no c/o pain noted. Neuro check completed, general weakness noted, otherwise neurologically intact. No c/o CP or SOB. Pt oriented to room, and use of call bell, upon arrival to floor, with pt verbalizing understanding. Pt down to complete MRI upon arrival to unit, no complications noted. Education provided on all medications and treatments provided, with pt verbalizing understanding. Personal items and call bell left within reach of pt. Plan of care is ongoing at this time.
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
## 2024-04-09 NOTE — H&P
Hospital Medicine History & Physical Note    Date of Service  4/9/2024    Primary Care Physician  Pcp Pt States None    Consultants  critical care and neurology    Specialist Names: Dr. Lee and Dr. Xiao    Code Status  Full Code    Chief Complaint  Chief Complaint   Patient presents with    Numbness     Patient c/o numbness to BLE and BUE onset at 1300 yesterday.     Joint Pain     Patient c/o joint pain to arms and legs with movement.     Dizziness     Onset at 1300 yesterday.     Low Back Pain     Onset at 1300 yesterday       History of Presenting Illness  Delfino Swartz is a 33 y.o. male with no past medical history who presented 4/9/2024 with initially with right sided below the knee numbness and weakness for one day. He was feeling as usually. Suddenly felt as his right leg was heavy as cement. He also endorse mid-low back pain since yesterday. He describes that as sharp, 7/10 moving makes it worse. Denies trauma, fever or chills. Also a headache which is 6/10, sharp, no changes with light. Denies n/v. He has intermittent tension headache, but nothing like this. Denies any sick contact exposure. No recent traveling. He was playing basketball a day prior. He tried one time ibuprofen at home with no relieve on his symptoms.   On examination he has weakness 4/5 bilateral lower extremity and 3/5- upper extremities. No other neurological findings.   Additionally he reports shortness of breath. However he saturation is normal on room air .   Case discussed with ER, critical care and neurology. Stat mri of spine for further eval. Neuro checks and will consider LP after MRI   He never smoked, denies any drug use. He might have couple of drinks socially in a month.       I discussed the plan of care with patient, family, bedside RN, , pharmacy, and critical care and neurology.    Review of Systems  Review of Systems   Constitutional:  Negative for chills, fever and malaise/fatigue.   HENT:   Negative for sinus pain and sore throat.    Eyes:  Positive for blurred vision and double vision. Negative for photophobia.   Respiratory:  Positive for shortness of breath. Negative for cough, sputum production and wheezing.    Cardiovascular:  Negative for chest pain, palpitations, leg swelling and PND.   Gastrointestinal:  Negative for abdominal pain, blood in stool, constipation, diarrhea, heartburn, nausea and vomiting.   Genitourinary:  Negative for dysuria, frequency and urgency.   Musculoskeletal:  Positive for back pain and joint pain.   Neurological:  Positive for tingling, sensory change, focal weakness, weakness and headaches. Negative for dizziness, tremors, speech change, seizures and loss of consciousness.   Psychiatric/Behavioral:  The patient is nervous/anxious.        Past Medical History   has no past medical history on file.    Surgical History   has a past surgical history that includes knee arthroscopy (Left).     Family History  family history is not on file.   Family history reviewed with patient. There is no family history that is pertinent to the chief complaint.   Mother with colon cancer, father with DMII, and Chron disease     Social History   reports that he has never smoked. He has never used smokeless tobacco. He reports current alcohol use. He reports that he does not use drugs.    Allergies  No Known Allergies    Medications  Prior to Admission Medications   Prescriptions Last Dose Informant Patient Reported? Taking?   ibuprofen (MOTRIN) 200 MG Tab 4/8/2024 at 2200 Patient Yes Yes   Sig: Take 200 mg by mouth every 6 hours as needed. Indications: Pain      Facility-Administered Medications: None       Physical Exam  Temp:  [36.6 °C (97.9 °F)] 36.6 °C (97.9 °F)  Pulse:  [65] 65  Resp:  [18] 18  BP: (141)/(82) 141/82  SpO2:  [98 %] 98 %  Blood Pressure: (!) 141/82   Temperature: 36.6 °C (97.9 °F)   Pulse: 65   Respiration: 18   Pulse Oximetry: 98 %       Physical Exam  Vitals and  nursing note reviewed.   Constitutional:       General: He is not in acute distress.     Appearance: Normal appearance. He is not ill-appearing or toxic-appearing.   HENT:      Head: Normocephalic and atraumatic.      Nose: No congestion or rhinorrhea.      Mouth/Throat:      Mouth: Mucous membranes are moist.      Pharynx: No oropharyngeal exudate or posterior oropharyngeal erythema.   Eyes:      General: No scleral icterus.     Extraocular Movements: Extraocular movements intact.      Conjunctiva/sclera: Conjunctivae normal.      Pupils: Pupils are equal, round, and reactive to light.   Cardiovascular:      Rate and Rhythm: Normal rate.      Heart sounds: No murmur heard.  Pulmonary:      Effort: Pulmonary effort is normal. No respiratory distress.      Breath sounds: No wheezing or rales.   Abdominal:      General: There is no distension.      Palpations: Abdomen is soft.      Tenderness: There is no abdominal tenderness. There is no guarding.   Musculoskeletal:         General: No swelling.      Cervical back: No rigidity.   Skin:     Capillary Refill: Capillary refill takes more than 3 seconds.      Coloration: Skin is not jaundiced.      Findings: No bruising.   Neurological:      Mental Status: He is alert and oriented to person, place, and time. Mental status is at baseline.      Cranial Nerves: No cranial nerve deficit.      Sensory: Sensory deficit present.      Motor: Weakness present.      Comments: Difficult to assess reflexes. Pt reports low back pain while checking reflexes.   Psychiatric:         Mood and Affect: Mood normal.         Behavior: Behavior normal.         Thought Content: Thought content normal.         Judgment: Judgment normal.         Laboratory:  Recent Labs     04/09/24  0909   WBC 6.4   RBC 5.68   HEMOGLOBIN 17.1   HEMATOCRIT 48.7   MCV 85.7   MCH 30.1   MCHC 35.1   RDW 38.1   PLATELETCT 255   MPV 10.5     Recent Labs     04/09/24  0909   SODIUM 139   POTASSIUM 3.8   CHLORIDE 104  "  CO2 23   GLUCOSE 105*   BUN 13   CREATININE 0.70   CALCIUM 9.3     Recent Labs     04/09/24  0909   ALTSGPT 13   ASTSGOT 23   ALKPHOSPHAT 101*   TBILIRUBIN 0.4   GLUCOSE 105*         No results for input(s): \"NTPROBNP\" in the last 72 hours.      No results for input(s): \"TROPONINT\" in the last 72 hours.    Imaging:  CT-HEAD W/O   Final Result      1.  Head CT without contrast within normal limits. No evidence of acute cerebral infarction, hemorrhage or mass lesion.   2.  Small right posterior frontal cortical calcification.      DX-CHEST-PORTABLE (1 VIEW)   Final Result      No acute cardiac or pulmonary abnormalities are identified.      MR-CERVICAL SPINE-WITH & W/O    (Results Pending)   MR-LUMBAR SPINE-WITH & W/O    (Results Pending)   MR-THORACIC SPINE-WITH & W/O    (Results Pending)       no X-Ray or EKG requiring interpretation    Assessment/Plan:  Justification for Admission Status  I anticipate this patient will require at least two midnights for appropriate medical management, necessitating inpatient admission because needs to rule out Guillain [place], multiple sclerosis, discitis, vs lumbar spine septic joints      Patient will need a Telemetry bed on MEDICAL service .  The need is secondary to rule out stroke and cardiac arhythmia. .    Alkaline phosphatase elevation- (present on admission)  Assessment & Plan  Slight elevated. Dehydration ??   Continue to monitor     Complaints of weakness of lower extremity- (present on admission)  Assessment & Plan  Sudden, weakness 4/5. Pt cannot walk  At this point differential is broad. Cannot rule out spine infection as osteomyelitis vs discitis vs septic joint vs hematoma vs spine compression vs Guillain [place], vs MS   Stat MRI ordered and to follow   Blood cultures, ESR, procal, CRP pending  We will need LP if no obvious signs of infection on MRI spine   No need for brain MRI since low suspicion for mass or hematoma or CVA  Continue neuro checks  Tele " monitoring  Seizure precaution   Appreciate recs from neuro   Avoid DVTs prophylactic           VTE prophylaxis: SCDs/TEDs and pharmacologic prophylaxis contraindicated due to pending spine MRI, before starting prophylaxis

## 2024-04-09 NOTE — ED NOTES
Patient wheeled to [facility] and transferred to [facility] without incident. Patient changed into gown and placed on monitor.

## 2024-04-09 NOTE — ED TRIAGE NOTES
.eDlfino Swartz  .  Chief Complaint   Patient presents with    Numbness     Patient c/o numbness to BLE and BUE onset at 1300 yesterday.     Joint Pain     Patient c/o joint pain to arms and legs with movement.     Dizziness     Onset at 1300 yesterday.     Low Back Pain     Onset at 1300 yesterday     Patient to triage with above complaints. Patient aao x 4, though appears tried and slightly pale. GOMEZ,  equal.     Patient to lobby and instructed to inform staff of any needs.

## 2024-04-09 NOTE — ASSESSMENT & PLAN NOTE
Sudden, weakness 4/5. Pt cannot walk  At this point differential is broad. Cannot rule out spine infection as osteomyelitis vs discitis vs septic joint vs hematoma vs spine compression vs Guillain [place], vs MS   Stat MRI ordered and to follow   Blood cultures, ESR, procal, CRP pending  We will need LP if no obvious signs of infection on MRI spine   No need for brain MRI since low suspicion for mass or hematoma or CVA  Continue neuro checks  Tele monitoring  Seizure precaution   Appreciate recs from neuro   Avoid DVTs prophylactic

## 2024-04-10 VITALS
DIASTOLIC BLOOD PRESSURE: 64 MMHG | BODY MASS INDEX: 24.82 KG/M2 | WEIGHT: 182.98 LBS | HEART RATE: 70 BPM | TEMPERATURE: 97.9 F | SYSTOLIC BLOOD PRESSURE: 109 MMHG | RESPIRATION RATE: 16 BRPM | OXYGEN SATURATION: 96 %

## 2024-04-10 PROBLEM — F44.7 FUNCTIONAL NEUROLOGICAL SYMPTOM DISORDER WITH MIXED SYMPTOMS: Status: ACTIVE | Noted: 2024-04-10

## 2024-04-10 LAB
ANION GAP SERPL CALC-SCNC: 11 MMOL/L (ref 7–16)
BUN SERPL-MCNC: 9 MG/DL (ref 8–22)
CALCIUM SERPL-MCNC: 9.1 MG/DL (ref 8.5–10.5)
CHLORIDE SERPL-SCNC: 103 MMOL/L (ref 96–112)
CHOLEST SERPL-MCNC: 138 MG/DL (ref 100–199)
CO2 SERPL-SCNC: 24 MMOL/L (ref 20–33)
CREAT SERPL-MCNC: 0.84 MG/DL (ref 0.5–1.4)
EKG IMPRESSION: NORMAL
ERYTHROCYTE [DISTWIDTH] IN BLOOD BY AUTOMATED COUNT: 38.7 FL (ref 35.9–50)
GFR SERPLBLD CREATININE-BSD FMLA CKD-EPI: 119 ML/MIN/1.73 M 2
GLUCOSE SERPL-MCNC: 88 MG/DL (ref 65–99)
HCT VFR BLD AUTO: 45.7 % (ref 42–52)
HDLC SERPL-MCNC: 48 MG/DL
HGB BLD-MCNC: 15.6 G/DL (ref 14–18)
LDLC SERPL CALC-MCNC: 79 MG/DL
MAGNESIUM SERPL-MCNC: 2.3 MG/DL (ref 1.5–2.5)
MCH RBC QN AUTO: 29.7 PG (ref 27–33)
MCHC RBC AUTO-ENTMCNC: 34.1 G/DL (ref 32.3–36.5)
MCV RBC AUTO: 86.9 FL (ref 81.4–97.8)
PLATELET # BLD AUTO: 262 K/UL (ref 164–446)
PMV BLD AUTO: 10.4 FL (ref 9–12.9)
POTASSIUM SERPL-SCNC: 4 MMOL/L (ref 3.6–5.5)
RBC # BLD AUTO: 5.26 M/UL (ref 4.7–6.1)
SODIUM SERPL-SCNC: 138 MMOL/L (ref 135–145)
TRIGL SERPL-MCNC: 56 MG/DL (ref 0–149)
WBC # BLD AUTO: 9.3 K/UL (ref 4.8–10.8)

## 2024-04-10 PROCEDURE — 83735 ASSAY OF MAGNESIUM: CPT

## 2024-04-10 PROCEDURE — 80061 LIPID PANEL: CPT

## 2024-04-10 PROCEDURE — 99232 SBSQ HOSP IP/OBS MODERATE 35: CPT | Performed by: PSYCHIATRY & NEUROLOGY

## 2024-04-10 PROCEDURE — 36415 COLL VENOUS BLD VENIPUNCTURE: CPT

## 2024-04-10 PROCEDURE — 85027 COMPLETE CBC AUTOMATED: CPT

## 2024-04-10 PROCEDURE — 99239 HOSP IP/OBS DSCHRG MGMT >30: CPT | Mod: GC | Performed by: INTERNAL MEDICINE

## 2024-04-10 PROCEDURE — 97162 PT EVAL MOD COMPLEX 30 MIN: CPT

## 2024-04-10 PROCEDURE — 80048 BASIC METABOLIC PNL TOTAL CA: CPT

## 2024-04-10 PROCEDURE — 93010 ELECTROCARDIOGRAM REPORT: CPT | Performed by: INTERNAL MEDICINE

## 2024-04-10 RX ORDER — ACETAMINOPHEN 325 MG/1
650 TABLET ORAL EVERY 6 HOURS PRN
Qty: 30 TABLET | Refills: 0 | Status: SHIPPED | OUTPATIENT
Start: 2024-04-10

## 2024-04-10 ASSESSMENT — COGNITIVE AND FUNCTIONAL STATUS - GENERAL
MOBILITY SCORE: 24
SUGGESTED CMS G CODE MODIFIER MOBILITY: CH

## 2024-04-10 ASSESSMENT — GAIT ASSESSMENTS
GAIT LEVEL OF ASSIST: SUPERVISED
DISTANCE (FEET): 200
DEVIATION: NO DEVIATION

## 2024-04-10 ASSESSMENT — PAIN DESCRIPTION - PAIN TYPE: TYPE: ACUTE PAIN

## 2024-04-10 ASSESSMENT — FIBROSIS 4 INDEX: FIB4 SCORE: 0.75

## 2024-04-10 NOTE — PROGRESS NOTES
Pt c/o sharp pains across upper chest and a difficulty taking a deep breath. Vitals obtained, WNL. SaO2 97% on RA. HR WNL. Dr. Ortiz notified, and order for stat EKG, D Dimer, and Troponin level ordered. Report given to ESTHER Spicer and ESTHER Mera Charge nurse. EKG resulted NSR, no abnormalities. Awaiting lab results.
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
## 2024-04-10 NOTE — THERAPY
Physical Therapy   Initial Evaluation     Patient Name: Delfino Swartz  Age:  33 y.o., Sex:  male  Medical Record #: [id number]  Today's Date: 4/10/2024     Precautions  Precautions: Fall Risk    Assessment  Patient is a 33 y.o. male with hx of a learning disability per family, admitted with LE weakness and generalized pain. All imaging and testing negative, neurology stating it is likely a functional neurological disorder. PT eval complete, pt currently presents at his functional baseline and completed all mobility at SPV level with no AD. No deficits noted on exam. Anticipate that pt will be able to safely DC home with family support and outpatient PT follow up to help maintain his functional status. Patient will not be actively followed for physical therapy services at this time, however may be seen if requested by physician for 1 more visit within 30 days to address any discharge or equipment needs.     Plan    Physical Therapy Initial Treatment Plan   Duration: Evaluation only    DC Equipment Recommendations: None  Discharge Recommendations: Recommend outpatient physical therapy services to address higher level deficits         Vitals   O2 (LPM) 0   O2 Delivery Device None - Room Air   Pain 0 - 10 Group   Therapist Pain Assessment   (no c/o pain)   Non Verbal Descriptors   Non Verbal Scale  Calm   Prior Living Situation   Prior Services Home-Independent   Housing / Facility 1 Story House   Steps Into Home 1   Equipment Owned None   Lives with - Patient's Self Care Capacity Spouse;Related Adult   Comments reports living with his wife and her mom   Prior Level of Functional Mobility   Bed Mobility Independent   Transfer Status Independent   Ambulation Independent   Ambulation Distance community distances   Assistive Devices Used None   Stairs Independent   Cognition    Cognition / Consciousness WDL   Level of Consciousness Alert   Comments pleasant and participatory   Active ROM Lower Body    Active ROM Lower  Body  WDL   Strength Lower Body   Lower Body Strength  WDL   Sensation Lower Body   Lower Extremity Sensation   WDL   Coordination Lower Body    Coordination Lower Body  WDL   Balance Assessment   Sitting Balance (Static) Good   Sitting Balance (Dynamic) Good   Standing Balance (Static) Fair +   Standing Balance (Dynamic) Fair +   Weight Shift Sitting Good   Weight Shift Standing Good   Comments no AD   Bed Mobility    Supine to Sit Supervised   Sit to Supine Supervised   Scooting Supervised   Gait Analysis   Gait Level Of Assist Supervised   Assistive Device None   Distance (Feet) 200   # of Times Distance was Traveled 1   Deviation No deviation   # of Stairs Climbed 0   Weight Bearing Status no restrictions   Functional Mobility   Sit to Stand Supervised   Bed, Chair, Wheelchair Transfer Supervised   Mobility no AD   6 Clicks Assessment - How much HELP from from another person do you currently need... (If the patient hasn't done an activity recently, how much help from another person do you think he/she would need if he/she tried?)   Turning from your back to your side while in a flat bed without using bedrails? 4   Moving from lying on your back to sitting on the side of a flat bed without using bedrails? 4   Moving to and from a bed to a chair (including a wheelchair)? 4   Standing up from a chair using your arms (e.g., wheelchair, or bedside chair)? 4   Walking in hospital room? 4   Climbing 3-5 steps with a railing? 4   6 clicks Mobility Score 24   Activity Tolerance   Comments no overt pain, SOB, or fatigue   Education Group   Education Provided Role of Physical Therapist   Role of Physical Therapist Patient Response Patient;Acceptance;Explanation;Verbal Demonstration   Physical Therapy Initial Treatment Plan    Duration Evaluation only   Problem List    Problems None   Anticipated Discharge Equipment and Recommendations   DC Equipment Recommendations None   Discharge Recommendations Recommend outpatient  physical therapy services to address higher level deficits   Interdisciplinary Plan of Care Collaboration   IDT Collaboration with  Nursing;Family / Caregiver   Patient Position at End of Therapy In Bed;Call Light within Reach;Tray Table within Reach;Phone within Reach   Collaboration Comments RN updated   Session Information   Date / Session Number  4/10- 1x only

## 2024-04-10 NOTE — DISCHARGE INSTRUCTIONS
MEDICINE DISCHARGE INSTRUCTIONS  - You were admitted for weakness.  We had a neurologist come by and evaluate you.  - The good news is it seems that your weakness was not caused by a serious problem.  It was likely something that we call a functional neurologic disorder.  There are many factors that can lead to this disorder which is why we have set you up with multiple different specialists to help with this.  - Please follow-up with physical therapy to help with the weakness.  Please also follow-up with neurology and psychology to discuss possible treatment.  - Please return to the emergency department if you develop any new numbness, weakness, or tingling in your extremities or develop a very hard time speaking.      Discharge Instructions    Discharged to home by car with relative. Discharged via walking, hospital escort: Refused.  Special equipment needed: Not Applicable    Be sure to schedule a follow-up appointment with your primary care doctor or any specialists as instructed.     Discharge Plan:   Diet Plan: Discussed  Activity Level: Discussed  Confirmed Follow up Appointment: Appointment Scheduled  Confirmed Symptoms Management: Discussed  Medication Reconciliation Updated: Yes    I understand that a diet low in cholesterol, fat, and sodium is recommended for good health. Unless I have been given specific instructions below for another diet, I accept this instruction as my diet prescription.   Other diet: As tolerated    Special Instructions: None    -Is this patient being discharged with medication to prevent blood clots?  No    Is patient discharged on Warfarin / Coumadin?   No

## 2024-04-10 NOTE — PROGRESS NOTES
Pt discharged home. IV site removed, cath tip intact, no complications noted. Discharge paperwork/instructions discussed with pt, and questions answered. Wife at bedside. Both pt and wife verbalized understanding. Pt and pt's wife walked to pt's car w/o complication. All personal belongings gathered and taken home with pt.

## 2024-04-10 NOTE — PROGRESS NOTES
Patient complaints of chest pain (sternum area), non radiating 8/10 in pain scale. Difficult taking a deep breath. Oxycodone PRN given. Vital signs within normal range. Result of recent EKG, Trop and D- Dimer relayed to Dr. Herrera.     2120H, chest pain resolved, but still with headache, patient requested for Tylenol PRN. Verbalized relief.     0130H,went to bathroom with no difficulty. Denies any form of discomfort.

## 2024-04-10 NOTE — CARE PLAN
The patient is Stable - Low risk of patient condition declining or worsening    Shift Goals  Clinical Goals: neuro check q4, monitor vital signs and las,  Patient Goals: pain control  Family Goals: comfort    Progress made toward(s) clinical / shift goals:          Problem: Neuro Status  Goal: Neuro status will remain stable or improve  Outcome: Progressing  Note: Remain baseline mental status, vital signs within normal limits. Muscle strength remains the same. Ambulatory.      Problem: Fall Risk  Goal: Patient will remain free from falls  Outcome: Progressing  Note: Patient remained free from falls. All fall precautions in place. Patient educated the need to call when needed assistance, patient verbalized understanding. Call light and personal belongings within reach.

## 2024-04-10 NOTE — PROGRESS NOTES
Telemetry Report:    Rhythm:     SB-SR   Heart Rate: 59-71  Ectopy:      None       MO:  .16           QRS:       .06  QT:   .34      Per Telestrip from Monitor Room

## 2024-04-10 NOTE — DISCHARGE SUMMARY
[facility] Internal Medicine Discharge Summary    Attending: Dr. Paulina Torres  Senior Resident: Dr. Sanchez  Intern:  Dr. Alcaraz  Contact Number: [phone]    CHIEF COMPLAINT ON ADMISSION  Chief Complaint   Patient presents with    Numbness     Patient c/o numbness to BLE and BUE onset at 1300 yesterday.     Joint Pain     Patient c/o joint pain to arms and legs with movement.     Dizziness     Onset at 1300 yesterday.     Low Back Pain     Onset at 1300 yesterday       Reason for Admission  Dizziness/Numbness     Admission Date  4/9/2024    CODE STATUS  Full Code    HPI & HOSPITAL COURSE  This is a 33 y.o. male here with much significant past medical history who presented for evaluation of sudden onset weakness.  Patient reported this weakness and pain in his lower extremities.  He was evaluated by neurology who did not find any significant neurological deficits.  He also underwent MRI of the brain, cervical spine, thoracic spine, and lumbar spine not any significant findings.  Full workup was done and was found to be normal.  Therefore, neurology believes this is likely a functional neurologic disorder.  He will need follow-up with neurology, psychology, and PT.  Therefore, he was discharged in stable condition.    Therefore, he is discharged in good and stable condition to home with close outpatient follow-up.    The patient recovered much more quickly than anticipated on admission.    Discharge Date  4/10/2024    Physical Exam on Day of Discharge  Physical Exam  Constitutional:       General: He is not in acute distress.     Appearance: Normal appearance. He is normal weight. He is not ill-appearing, toxic-appearing or diaphoretic.   HENT:      Head: Normocephalic and atraumatic.      Nose: Nose normal. No rhinorrhea.      Mouth/Throat:      Mouth: Mucous membranes are moist.      Pharynx: No oropharyngeal exudate or posterior oropharyngeal erythema.   Eyes:      General: No scleral icterus.        Right eye: No  discharge.         Left eye: No discharge.      Extraocular Movements: Extraocular movements intact.      Conjunctiva/sclera: Conjunctivae normal.   Cardiovascular:      Rate and Rhythm: Normal rate and regular rhythm.      Pulses: Normal pulses.      Heart sounds: Normal heart sounds. No murmur heard.     No friction rub. No gallop.   Pulmonary:      Effort: Pulmonary effort is normal. No respiratory distress.      Breath sounds: Normal breath sounds. No wheezing, rhonchi or rales.   Abdominal:      General: Abdomen is flat. Bowel sounds are normal. There is no distension.      Palpations: Abdomen is soft.      Tenderness: There is no abdominal tenderness. There is no guarding or rebound.   Musculoskeletal:         General: Normal range of motion.      Cervical back: Normal range of motion. No rigidity.      Right lower leg: No edema.      Left lower leg: No edema.   Skin:     General: Skin is warm and dry.   Neurological:      General: No focal deficit present.      Mental Status: He is alert.      Cranial Nerves: Cranial nerves 2-12 are intact.      Sensory: Sensation is intact.      Motor: Motor function is intact.      Coordination: Coordination is intact.      Deep Tendon Reflexes:      Reflex Scores:       Bicep reflexes are 2+ on the right side and 2+ on the left side.       Patellar reflexes are 2+ on the right side and 2+ on the left side.  Psychiatric:         Mood and Affect: Mood normal.         Behavior: Behavior normal.         FOLLOW UP ITEMS POST DISCHARGE  Follow-up with PCP, neurology, psychology, and PT.    DISCHARGE DIAGNOSES  Principal Problem:    Weakness (POA: Yes)  Active Problems:    Complaints of weakness of lower extremity (POA: Yes)    Alkaline phosphatase elevation (POA: Yes)    Functional neurological symptom disorder with mixed symptoms (POA: Unknown)  Resolved Problems:    * No resolved hospital problems. *      FOLLOW UP  No future appointments.  No follow-up provider  specified.    MEDICATIONS ON DISCHARGE     Medication List        START taking these medications        Instructions   acetaminophen 325 MG Tabs  Commonly known as: Tylenol   Take 2 Tablets by mouth every 6 hours as needed for Mild Pain or Moderate Pain.  Dose: 650 mg            CONTINUE taking these medications        Instructions   ibuprofen 200 MG Tabs  Commonly known as: Motrin   Take 200 mg by mouth every 6 hours as needed. Indications: Pain  Dose: 200 mg              Allergies  No Known Allergies    DIET  Orders Placed This Encounter   Procedures    Diet Order Diet: Regular     Standing Status:   Standing     Number of Occurrences:   1     Order Specific Question:   Diet:     Answer:   Regular [1]       ACTIVITY  As tolerated.  Weight bearing as tolerated    CONSULTATIONS  Neurology    PROCEDURES  None    LABORATORY  Lab Results   Component Value Date    SODIUM 138 04/10/2024    POTASSIUM 4.0 04/10/2024    CHLORIDE 103 04/10/2024    CO2 24 04/10/2024    GLUCOSE 88 04/10/2024    BUN 9 04/10/2024    CREATININE 0.84 04/10/2024        Lab Results   Component Value Date    WBC 9.3 04/10/2024    HEMOGLOBIN 15.6 04/10/2024    HEMATOCRIT 45.7 04/10/2024    PLATELETCT 262 04/10/2024        Total time of the discharge process exceeds 42 minutes.
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
## 2024-04-14 LAB
BACTERIA BLD CULT: NORMAL
BACTERIA BLD CULT: NORMAL
SIGNIFICANT IND 70042: NORMAL
SIGNIFICANT IND 70042: NORMAL
SITE SITE: NORMAL
SITE SITE: NORMAL
SOURCE SOURCE: NORMAL
SOURCE SOURCE: NORMAL

## 2024-05-10 ENCOUNTER — APPOINTMENT (OUTPATIENT)
Dept: BEHAVIORAL HEALTH | Facility: CLINIC | Age: 34
End: 2024-05-10
Payer: COMMERCIAL

## 2024-05-10 VITALS
HEART RATE: 82 BPM | SYSTOLIC BLOOD PRESSURE: 110 MMHG | RESPIRATION RATE: 18 BRPM | OXYGEN SATURATION: 99 % | HEIGHT: 72 IN | DIASTOLIC BLOOD PRESSURE: 74 MMHG | WEIGHT: 180.8 LBS | BODY MASS INDEX: 24.49 KG/M2

## 2024-05-10 DIAGNOSIS — F44.4 FUNCTIONAL NEUROLOGICAL SYMPTOM DISORDER WITH WEAKNESS OR PARALYSIS: ICD-10-CM

## 2024-05-10 PROCEDURE — 3078F DIAST BP <80 MM HG: CPT | Performed by: STUDENT IN AN ORGANIZED HEALTH CARE EDUCATION/TRAINING PROGRAM

## 2024-05-10 PROCEDURE — 3074F SYST BP LT 130 MM HG: CPT | Performed by: STUDENT IN AN ORGANIZED HEALTH CARE EDUCATION/TRAINING PROGRAM

## 2024-05-10 PROCEDURE — 99205 OFFICE O/P NEW HI 60 MIN: CPT | Performed by: STUDENT IN AN ORGANIZED HEALTH CARE EDUCATION/TRAINING PROGRAM

## 2024-05-10 ASSESSMENT — ANXIETY QUESTIONNAIRES
6. BECOMING EASILY ANNOYED OR IRRITABLE: NOT AT ALL
IF YOU CHECKED OFF ANY PROBLEMS ON THIS QUESTIONNAIRE, HOW DIFFICULT HAVE THESE PROBLEMS MADE IT FOR YOU TO DO YOUR WORK, TAKE CARE OF THINGS AT HOME, OR GET ALONG WITH OTHER PEOPLE: NOT DIFFICULT AT ALL
1. FEELING NERVOUS, ANXIOUS, OR ON EDGE: NOT AT ALL
4. TROUBLE RELAXING: SEVERAL DAYS
2. NOT BEING ABLE TO STOP OR CONTROL WORRYING: SEVERAL DAYS
5. BEING SO RESTLESS THAT IT IS HARD TO SIT STILL: NOT AT ALL
3. WORRYING TOO MUCH ABOUT DIFFERENT THINGS: SEVERAL DAYS
GAD7 TOTAL SCORE: 4
7. FEELING AFRAID AS IF SOMETHING AWFUL MIGHT HAPPEN: SEVERAL DAYS

## 2024-05-10 ASSESSMENT — PATIENT HEALTH QUESTIONNAIRE - PHQ9
CLINICAL INTERPRETATION OF PHQ2 SCORE: 3
5. POOR APPETITE OR OVEREATING: 0 - NOT AT ALL
SUM OF ALL RESPONSES TO PHQ QUESTIONS 1-9: 4

## 2024-05-10 ASSESSMENT — FIBROSIS 4 INDEX: FIB4 SCORE: 0.75

## 2024-05-10 NOTE — PATIENT INSTRUCTIONS
https://neurosymptoms.org/en/    https://fndhope.org/    https://www.fndaction.org.uk/    [place]    [place]    [facility]  Address: [address] [place] [address]  Phone: [phone]  Notes: [facility] is a consortium of private practitioners who advertise together, and are in business separately as the following: Lilian Mackey, Marriage and Family Therapist, Licensed Clinical Alcohol & Drug Counselor; Estefania Cantor, [employer] Practitioner; Radha Thapa, Occupational Therapist and Reiki Master; Liza Ireland, Marriage and Family Therapist  [facility]  Address: [address] [place] [address]  Phone: [phone]  Notes: ADHD skills building, ACT Therapy, CBT, EMDR, Trauma focused therapy, Play based therapy, Family therapy  [facility]  Address: [address] [facility] [address] [place] [address]  Phone: [phone]  Notes: Depression, Anxiety and stress management, marriage and family, DBT, EMDR  [facility]  Address: [address] [address]  Phone: [phone]  Notes: wellness, mindfulness, self-care, LGBTQ+, goal setting and achievement   [facility]  Address: [address] [place] [address]  Phone: [phone]  Notes: Cognitive Behavioral Therapy (CBT), Dialectical Behavioral Therapy (DBT), Emotionally Focused Therapy (EFT), Gottman Couples Therapy, Positive Discipline and Child Education, Play Therapy  [facility]  Address: [address] [facility] [address]  Phone: [phone]  Notes: substance abuse, domestic violence, anger management, marriage and family counseling, mindfulness as well as Substance Abuse and Anger Management/Domestic Violence Groups              [facility]: Darlyn Arce.NATASHA, AMY AcunaW, Eden Pike, PhD  Address: [address] [address]  Phone: [phone]  Notes: sleep  disorders and health related concerns such as chronic pain, depression and anxiety  Flako Crespo, Ph.D at [facility]  Address: [address], [place] [address]  Phone: [phone]  Notes: sport psychology, resilience, motivation, self-talk, confidence  [facility]  Address: [address], [address], [facility], [address]  Phone: [phone]  Notes: Certified [facility] ([facility]), offering peer services, case management, crisis intervention, Basic Skills Training, and Psycho-Social Rehabilitation; provides MAT for adolescent and adult opioid users, family therapy   [facility]  Address: [address] [place] [address]  Phone: [phone]  Notes: Many group class options, Domestic Violence, Anger Management, Malawian speaking, Substance Abuse, Peaceful families parenting, Family therapy, Sliding scale fees  [facility]  Address: [address] [place] [address]  Phone: [phone]   Notes: EMDR, Malawian speaking, Psychedelic assisted therapy, couples and family counseling  [facility]  Address: [address] [place] [address]  Phone: [phone]  Notes: Testing: Cognitive evaluations, Learning disability evaluations, ADHD evaluations, Pre-employment evaluations, Fitness for duty evaluations, Bariatric surgery evaluations, Pain procedure evaluations  [facility]  Address: [address] [place] [address] [address], [place] [address]  Phone: [phone]  Notes: Borderline Personality Disorder, EMDR, Grief Counseling, Couples/family/parenting

## 2024-05-10 NOTE — PROGRESS NOTES
INITIAL PSYCHIATRIC EVALUATION      This provider informed the patient their medical records are totally confidential except for the use by other providers involved in their care, or if the patient signs a release, or to report instances of child or elder abuse, or if it is determined they are an immediate risk to harm themselves or others.     Patient: Delfino Swartz     Date: 5/10/2024       MR#: [id number]   : 1990          IDENTIFYING INFORMATION:  This is a 33 y.o. old male who presents for an initial evaluation.   Persons in attendance: Patient    CHIEF COMPLAINT:  establish care     REFERRAL SOURCE: after recent hospitalization    HPI:       Mr. Swartz is a 34 y/o M with PMH of recently diagnosed Functional neurological disorder after a hospitalization from -4/10/24.    Per chart review, patient had been admitted from  to 4/10/2024.  Patient had presented with 1 day of right lower extremity numbness and weakness and generalized pain to mid back and bilateral lower extremity.  Per chart review and discharge summary patient evaluated by ED physician admitting physician, and neurology consult team, including Dr. Lee with neurology.  MRI was negative for any acute pathologies.  Per chart review and discharge summary no indication for CSF analysis.  He had evaluated patient as well as neurology Dr. Lee and reevaluated patient, it was determined by the treatment team the patient may be diagnosed with functional neurological disorder, therefore referrals are placed for outpatient neurology, physical therapy, psychiatry, and CBT therapy.    Patient reports on 2024, on that Monday he been doing fine at work and then reports that after lunch he had noted in his right arm experiencing numbness and notes that this then travel to his left arm, and then he reports that this then travel to his bilateral lower extremity, however noted to skip his thighs and went to his knees and legs.  Patient  "reports that he felt weak, and noted \"cement on both my legs.\"  Patient reports he went home and told his wife and reports taking 2 Tylenol's and then reports waking up the next morning at 6 AM however reports struggling even to get to the restroom, and notes that he was then taken to the ED with his wife due to concerns of his weakness.  Patient reports that he has been recovering well since the hospitalization however reports that he does at times still struggle with some shortness of breath, and notes that at work sometimes he gets tired and will need to pace himself and slow down and brief.  Patient reports that he still experiences weakness in his legs as well as some shortness of breath and he reports the frequency this occurs 3 times per week.    On psychiatric review of systems patient reports that his mood has been \"happy.\"  Patient reports that his sleep has been good sleeping approximately 8 hours each night.  Patient denies any difficulty falling asleep or staying asleep and notes that he will also get around 9 PM and wake up at 7 AM.  Patient denies any overt anhedonia and reports that today he is excited he will be going to the [employer] baseball game.  Patient reports that he enjoys baseball and enjoys watching the feelings and giants.  Patient reports year additionally enjoys going to [place] with his wife walking around the town as well as going for walks with his wife.  Patient denies any feelings of guilt, hopelessness helplessness or worthlessness.  Patient reports that his energy has been good overall except for when he experiences weakness in his legs as well as some shortness of breath, however he reports dealing with this by taking a break and sitting down and catching his breath.  Patient reports that his concentration and appetite have been good and at baseline.  Patient denies any SI, HI, AVH.  Patient denies any symptoms of anxiety, denied feeling on edge or restless.  Patient denies any signs " or symptoms consistent with a history of edmond or hypomania or psychosis or paranoia.    Of note provider did ask patient if he would be okay for him to speak with his wife, however patient reported, that he would discuss the encounter with his wife, and this provider did not need to speak with her.    In the lead up to the patient's hospitalization provider did ask about potential stressors, patient denied any overt stressors he was facing he did note that he had been experiencing some level of stress in regards to money and finances and notes that he had been planning to go on a fishing trip with his brothers to [facility], and he reports that he did end up going and he had a great time recently.  Patient additionally reported minor stressor in the form of feeling overwhelmed at work however he reports that since he has been back at work he has been doing well.  Patient reports that he enjoys working he works in [place], [place].  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    In regards to treatment provider engage the patient in education about functional neurological disorder, as well as provided multiple resources about functional neurological disorder as well as provide the example of, the computer, and functioning hardware, and issues may arise with the interface of fully functioning hardware and software.  Additionally showed educational video to the patient about function neurological disorder.  Provided several resources in the form of educational websites that were attached to the patient AVS.  Additionally encouraged patient to engage in physical therapy, as well as follow-up and establish with a PCP, as well as establish with neurology, and provided him resources in regards to therapy resources that the patient could engage in CBT.  Patient reported  "that if needed in the future he would make further appointments.  Provider did additionally spend time counseled the patient on good lifestyle habits in the form of aerobic/cardiovascular exercise at least 150 minutes/week, as well as provided patient with education material about good lifestyle habits in the form of healthy diets, which were attached to the patient's AVS.        Current stressors include.  Social support from family.     Review of Systems:   Positive Symptoms in ROS highlighted in Bold   Constitutional: Fever, major weight changes   Neuro: HA, light headedness, changes in vision, dizziness, numbness/tingling, new focal weakness, or abnormal movements   Respiratory: shortness of breath, no cough   Cardiac: chest pain, no palpitations   GI: abdominal pain, nausea, vomitin and then tell me everything that I physical therapy g, diarrhea, and constipation.   MSK: pain in extremities   Skin: rash   Psych: As per HPI     PAST PSYCHIATRIC HISTORY:   Past Psychiatrist or Therapist:  denies   Past Medications: denies    Past Diagnosis: denies    Inpatient Psychiatric Hospitalizations: denies    Contemplated suicide: denies    Suicide attempts: denies    Homicidal thoughts: denies    Self Injury/High risk behavior: denies      SOCIAL HISTORY:   Description of childhood (born, siblings, raised): born and raised in [place], raised by parents. Reports childhood was \"good.\" Siblings- 2 older brothers   History of physical, verbal, sexual abuse: denies    Marital history: 1xmarried for 8 years   Children: denies    Education: high school.    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 Done this for the past 2 years   Disability: denies    Current living situation: Lives [place] in house with wife and 1 dog.    Legal history: denies     history: denies    Mormonism affiliation: Adventist   Access to firearms: denies  " "    SUBSTANCE ABUSE HISTORY:   Nicotine: denies    Alcohol: denies    Cannabis: denies    Cocaine: denies    Heroin/ Narcotic Pain Medications: denies    Other: denies    Patient denies going to detox/rehab.     Patient denies having legal charges associated with drugs or alcohol.     [place]  records   reviewed.  No concerns about misuse of controlled substance.    FAMILY PSYCHIATRIC HISTORY:   Family member with psychiatric or mental illness: denies    Suicides or attempts:  denies    Substance abuse:  denies      Allergies:  No Known Allergies     EXAM     PHYSICAL EXAMINATION  Vital signs: /74   Pulse 82   Resp 18   Ht 1.829 m (6')   Wt 82 kg (180 lb 12.8 oz)   SpO2 99%   BMI 24.52 kg/m²   Musculoskeletal: Gait is normal.   Abnormal movements:  No gross abnormalities noted.    MENTAL STATUS EXAMINATION    General:   Behavior: Pt is calm and cooperative with interview.  no apparent distress.  Eye contact is   appropriate.  Psychomotor: Psychomotor agitation or retardation no noted.  Tics or tremors no noted.  Speech: rate within normal limits and volume within normal limits  Mood: \"happy\"  Affect: Full range and overall euthymic ,  Thought Process: Logical, Goal-directed, and concrete and childlike  Thought Content: denies suicidal ideation, denies homicidal ideation. Within normal limits  Perception: denies auditory hallucinations, denies visual hallucinations. No delusions noted on interview.    Insight: Adequate  Judgment: Adequate  Cognition: Grossly Intact, of note patient did appear childlike in nature as well as concrete in his thought process  Orientation: Oriented to person, place, time, and situation  Memory: No gross evidence of memory deficits   Attention & Concentration: grossly intact  Language: Grossly intact, not formally assessed  Abstraction: Grossly intact, not formally assessed  General Fund of Knowledge: Not formally assessed  Clock Drawing: Not performed     LABS:  Lab Results " "  Component Value Date/Time    CHOLSTRLTOT 138 04/10/2024 01:19 AM    TRIGLYCERIDE 56 04/10/2024 01:19 AM    HDL 48 04/10/2024 01:19 AM    LDL 79 04/10/2024 01:19 AM     Lab Results   Component Value Date/Time    SODIUM 138 04/10/2024 01:19 AM    POTASSIUM 4.0 04/10/2024 01:19 AM    CHLORIDE 103 04/10/2024 01:19 AM    CO2 24 04/10/2024 01:19 AM    ANION 11.0 04/10/2024 01:19 AM    GLUCOSE 88 04/10/2024 01:19 AM    BUN 9 04/10/2024 01:19 AM    CREATININE 0.84 04/10/2024 01:19 AM    CALCIUM 9.1 04/10/2024 01:19 AM    ASTSGOT 23 04/09/2024 09:09 AM    ALTSGPT 13 04/09/2024 09:09 AM    TBILIRUBIN 0.4 04/09/2024 09:09 AM    ALBUMIN 4.8 04/09/2024 09:09 AM    TOTPROTEIN 7.8 04/09/2024 09:09 AM    GLOBULIN 3.0 04/09/2024 09:09 AM    AGRATIO 1.6 04/09/2024 09:09 AM     Lab Results   Component Value Date/Time    WBC 9.3 04/10/2024 01:19 AM    RBC 5.26 04/10/2024 01:19 AM    HEMOGLOBIN 15.6 04/10/2024 01:19 AM    HEMATOCRIT 45.7 04/10/2024 01:19 AM    MCV 86.9 04/10/2024 01:19 AM    MCH 29.7 04/10/2024 01:19 AM    MCHC 34.1 04/10/2024 01:19 AM    RDW 38.7 04/10/2024 01:19 AM    PLATELETCT 262 04/10/2024 01:19 AM    MPV 10.4 04/10/2024 01:19 AM    NEUTSPOLYS 64.70 04/09/2024 09:09 AM    LYMPHOCYTES 22.70 04/09/2024 09:09 AM    MONOCYTES 7.70 04/09/2024 09:09 AM    EOSINOPHILS 4.40 04/09/2024 09:09 AM    BASOPHILS 0.30 04/09/2024 09:09 AM    IMMGRAN 0.20 04/09/2024 09:09 AM    NRBC 0.00 04/09/2024 09:09 AM    NEUTS 4.14 04/09/2024 09:09 AM    MONOS 0.49 04/09/2024 09:09 AM    EOS 0.28 04/09/2024 09:09 AM    BASO 0.02 04/09/2024 09:09 AM    IMMGRANAB 0.01 04/09/2024 09:09 AM    NRBCAB 0.00 04/09/2024 09:09 AM     Lab Results   Component Value Date/Time    HBA1C 4.8 04/09/2024 0909    AVGLUC 91 04/09/2024 0909     Lab Results   Component Value Date/Time    TSHULTRASEN 1.420 04/09/2024 0905     No results found for: \"FREET4\"  No results found for: \"25HYDROXY\"        SAFETY ASSESSMENT/RISK ASSESSMENTS      SAFETY ASSESSMENT - " SELF:    Does patient acknowledge current or past symptoms of dangerousness to self? No   History of suicide by family member: No   History of suicide by friend/significant other: No   Recent change in amount/specificity/intensity of suicidal thoughts or self-harm behavior? No   Current access to firearms, medications, or other identified means of suicide/self-harm? No   If yes, willing to restrict access to means of suicide/self-harm? Yes   Protective factors present:Restoration Affiliation, Social Support, Sense of responsibility to family, Positive problem-solving skills, access to healthcare, willingness to seek help, no access to guns, and Patient denies active suicidal ideations or plan     SAFETY ASSESSMENT - OTHERS:    Does patient acknowledge current or past symptoms of aggressive behavior or risk to others? No   Recent change in amount/specificity/intensity of thoughts or threats to harm others? No   Current access to firearms/other identified means of harm? No   If yes, willing to restrict access to weapons/means of harm? Yes      CURRENT RISK:  Though it is impossible to accurately predict with absolute certainty future events and human behaviors, an assessment of current risk factors and protective factors suggests that this patient's:       Suicidal: Low       Homicidal: Low       Self-Harm: Low       Relapse: Low       Crisis Safety Plan Reviewed Yes    Suicide Risk Assessment (Acute and Chronic):  Risk factors: Psychiatric Diagnosis, Recent stressful life event, Male gender, White Race, and possible intellectual disability/borderline intellectual functioning  Protective Factors: Restoration Affiliation, Social Support, Sense of responsibility to family, Positive problem-solving skills, access to healthcare, willingness to seek help, no access to guns, and Patient denies active suicidal ideations or plan  Though it is impossible to accurately predict with absolute certainty future events and human  behaviors, an assessment of current suicidal indicators, risk factors, and protective factors suggests that this patient's:  Acute Suicide Risk: low. -as stated above / increases with substance abuse.  Chronic Suicide Risk: low - as stated above / increases with substance abuse.       ASSESSMENT AND TREATMENT PLAN      DSM 5 Diagnosis: Functional neurological disorder, with weakness or paralysis, without psychological stressor       SCREENINGS:      4/9/2024     3:57 PM 5/10/2024     2:00 PM   Depression Screen (PHQ-2/PHQ-9)   PHQ-2 Total Score 0    PHQ-2 Total Score  3   PHQ-9 Total Score  4     Interpretation of PHQ-9 Total Score   Score Severity   1-4 No Depression   5-9 Mild Depression   10-14 Moderate Depression   15-19 Moderately Severe Depression   20-27 Severe Depression         5/10/2024     1:56 PM   YAMILET 7   YAMILET-7 Total Score 4     Interpretation of YAMILET 7 Total Score   Score Severity:  0-4 No Anxiety   5-9 Mild Anxiety  10-14 Moderate Anxiety  15-21 Severe Anxiety        Problem 1: Functional neurological disorder, with weakness or paralysis, without psychological stressor  Comment: Patient recently hospitalized on 4/9 to 4/10/2024 after experiencing weakness in his right arm, then traveling to his left arm as well as his lower extremity, except for his thighs.  Patient denies any particular stressor or major events that led up to his symptoms.  Patient denies any particular mood symptoms or anxiety symptoms.  Patient denies any major stressors leading up to the hospitalization.  Patient did note minor stressor of finances as well as at times feeling overwhelmed at work however denies any major contributor to his hospitalization.  On evaluation today patient appeared euthymic, and was excited to attend the [employer] baseball game later today.  Patient did report experiencing some weakness as well as shortness of breath, which occurs 3 days out of the week and frequency however reports overall improving since  his hospitalization.  Patient reports that he has not yet established with physical therapy and plans to do this.  At today's appointment provider attempted to provide education as well as additional resources to the patient about functional neurological disorder.  Provider provided a patient with the example of the computer, with no issues in terms of hardware however when the hardware interfaces with software, some issues arise.  Of note provider did ask about the patient's educational background and patient noted he graduated high school, was not in any special classes and received A's and B's, however during the evaluation patient was concrete in his thought process and very childlike.  Did ask patient if it was okay if this provider spoke to the patient's wife, however patient reported he would prefer that I mainly speak to him.  Encouraged patient to continue with PT, as well as get established with a psychologist/therapist to undergo CBT to better build up coping skills to process daily stressors, as well as encourage patient to establish with PCP and follow-up with neurology.  Provider additionally recommended to patient to make an additional appointment, if he felt he was struggling with any anxiety or depression.  Plan (include new prescriptions or refills):   - At today's appointment provider attempted to provide education as well as additional resources to the patient about functional neurological disorder.  Provider provided a patient with the example of the computer, with no issues in terms of hardware however when the hardware interfaces with software, some issues arise.   -Encouraged patient to follow-up with-physical therapy, therapy/psychology for CBT, neurology for any additional further workup  -Patient reported he would make additional appointments if needed, and as well as would talk to his wife about potentially getting connected with a therapist/psychologist  -Attach educational resources as  well as therapy resources in the [facility] area to the patient's AVS  -Additionally encourage general good lifestyle habits in the form of engaging in aerobic exercise at least 150 minutes/week, as well as counseled the patient on good nutrition, in the form of educational printouts there were attached to the patient's AVS  -Recommend updated labs including CMP, CBC, thyroid panel, Vit D, Vit B12, folate, thiamine  --Discussed the risks, benefits, alternatives associated with medications, patient verbalized understanding and denied any further questions or concerns and was in agreement with the treatment plan.        Pertinent Labs or imaging: Reviewed patient's most recent lab work from recent hospitalization in April 2024 creatinine of 0.84, TSH within normal limits.  Reviewed MRI noting no acute process, slightly low-lying cerebellar tonsils meeting criteria for Chiari I malformation, motion artifact was present during this imaging.  Additionally CT head noted no acute process, a comment on small right posterior frontal cortical calcification.  MRI of the spine cervical lumbar and thoracic, noting no significant or acute processes, MRI of the lumbar spine did provide minimal facet degeneration in the lower lumbar spine.       Medication options, alternatives (including no medications) and medication risks/benefits/side effects were discussed in detail.  Explained importance of contraceptive measures while on psychotropic medications, educated to let provider know if ever pregnant or wanting to become pregnant. Verbalized understanding.  The patient was advised to call, message provider on [employer], or come in to the clinic if symptoms worsen or if any future questions/issues regarding their medications arise; the patient verbalized understanding and agreement.    The patient was educated to call 911, call the suicide hotline, or go to local ER if having thoughts of suicide or homicide; verbalized  understanding.      Total time spent on the day of encounter: 69 minutes.         No orders of the defined types were placed in this encounter.       Requested Prescriptions      No prescriptions requested or ordered in this encounter         Return to clinic in Return if symptoms worsen or fail to improve.  or sooner if symptoms worsen.  Next Appointment:  instruction provided on how to make the next appointment.     This patient's overall prognosis is fair.     Patient’s ability to adhere to treatment plan is fair.      Crisis Plan:  Calling clinic. Calling a 24-hour crisis hotline number. Utilizing the ED in the event of an emergency.     The proposed treatment plan was discussed with the patient who was provided the opportunity to ask questions and make suggestions regarding alternative treatment. Patient verbalized understanding and expressed agreement with the plan.     Thank you for allowing me to participate in the care of this patient.    Provider Signature: Vikas Chapman M.D. 5/10/2024             PAST MEDICAL / SURGICAL HISTORY, ALLERGIES, CURRENT MEDICATIONS        History reviewed. No pertinent past medical history.   Past Surgical History:   Procedure Laterality Date    KNEE ARTHROSCOPY Left     2021 at [facility] per patient        No Known Allergies      Current Outpatient Medications   Medication Sig Dispense Refill    acetaminophen (TYLENOL) 325 MG Tab Take 2 Tablets by mouth every 6 hours as needed for Mild Pain or Moderate Pain. 30 Tablet 0    ibuprofen (MOTRIN) 200 MG Tab Take 200 mg by mouth every 6 hours as needed. Indications: Pain       No current facility-administered medications for this visit.        Provider Signature: Vikas Chapman M.D. 5/10/2024      NOTE: ?Portions of this note were created using voice recognition software. ?Minor syntax errors, grammatical content or spelling, and punctuation errors may have occurred unintentionally. ?Please notify the author if changes are necessary or  if the meaning of any statement is unclear.

## 2024-05-16 SDOH — HEALTH STABILITY: PHYSICAL HEALTH: ON AVERAGE, HOW MANY MINUTES DO YOU ENGAGE IN EXERCISE AT THIS LEVEL?: 10 MIN

## 2024-05-16 SDOH — ECONOMIC STABILITY: HOUSING INSECURITY
IN THE LAST 12 MONTHS, WAS THERE A TIME WHEN YOU DID NOT HAVE A STEADY PLACE TO SLEEP OR SLEPT IN A SHELTER (INCLUDING NOW)?: NO

## 2024-05-16 SDOH — ECONOMIC STABILITY: FOOD INSECURITY: WITHIN THE PAST 12 MONTHS, THE FOOD YOU BOUGHT JUST DIDN'T LAST AND YOU DIDN'T HAVE MONEY TO GET MORE.: NEVER TRUE

## 2024-05-16 SDOH — ECONOMIC STABILITY: TRANSPORTATION INSECURITY
IN THE PAST 12 MONTHS, HAS LACK OF TRANSPORTATION KEPT YOU FROM MEETINGS, WORK, OR FROM GETTING THINGS NEEDED FOR DAILY LIVING?: NO

## 2024-05-16 SDOH — ECONOMIC STABILITY: HOUSING INSECURITY: IN THE LAST 12 MONTHS, HOW MANY PLACES HAVE YOU LIVED?: 1

## 2024-05-16 SDOH — HEALTH STABILITY: MENTAL HEALTH
STRESS IS WHEN SOMEONE FEELS TENSE, NERVOUS, ANXIOUS, OR CAN'T SLEEP AT NIGHT BECAUSE THEIR MIND IS TROUBLED. HOW STRESSED ARE YOU?: ONLY A LITTLE

## 2024-05-16 SDOH — HEALTH STABILITY: PHYSICAL HEALTH: ON AVERAGE, HOW MANY DAYS PER WEEK DO YOU ENGAGE IN MODERATE TO STRENUOUS EXERCISE (LIKE A BRISK WALK)?: 5 DAYS

## 2024-05-16 SDOH — ECONOMIC STABILITY: TRANSPORTATION INSECURITY
IN THE PAST 12 MONTHS, HAS LACK OF RELIABLE TRANSPORTATION KEPT YOU FROM MEDICAL APPOINTMENTS, MEETINGS, WORK OR FROM GETTING THINGS NEEDED FOR DAILY LIVING?: NO

## 2024-05-16 SDOH — ECONOMIC STABILITY: TRANSPORTATION INSECURITY
IN THE PAST 12 MONTHS, HAS THE LACK OF TRANSPORTATION KEPT YOU FROM MEDICAL APPOINTMENTS OR FROM GETTING MEDICATIONS?: NO

## 2024-05-16 SDOH — ECONOMIC STABILITY: INCOME INSECURITY: IN THE LAST 12 MONTHS, WAS THERE A TIME WHEN YOU WERE NOT ABLE TO PAY THE MORTGAGE OR RENT ON TIME?: NO

## 2024-05-16 SDOH — ECONOMIC STABILITY: FOOD INSECURITY: WITHIN THE PAST 12 MONTHS, YOU WORRIED THAT YOUR FOOD WOULD RUN OUT BEFORE YOU GOT MONEY TO BUY MORE.: NEVER TRUE

## 2024-05-16 SDOH — ECONOMIC STABILITY: INCOME INSECURITY: HOW HARD IS IT FOR YOU TO PAY FOR THE VERY BASICS LIKE FOOD, HOUSING, MEDICAL CARE, AND HEATING?: NOT HARD AT ALL

## 2024-05-16 ASSESSMENT — SOCIAL DETERMINANTS OF HEALTH (SDOH)
HOW OFTEN DO YOU GET TOGETHER WITH FRIENDS OR RELATIVES?: THREE TIMES A WEEK
DO YOU BELONG TO ANY CLUBS OR ORGANIZATIONS SUCH AS CHURCH GROUPS UNIONS, FRATERNAL OR ATHLETIC GROUPS, OR SCHOOL GROUPS?: NO
HOW OFTEN DO YOU ATTEND CHURCH OR RELIGIOUS SERVICES?: NEVER
HOW OFTEN DO YOU HAVE SIX OR MORE DRINKS ON ONE OCCASION: MONTHLY
HOW HARD IS IT FOR YOU TO PAY FOR THE VERY BASICS LIKE FOOD, HOUSING, MEDICAL CARE, AND HEATING?: NOT HARD AT ALL
HOW MANY DRINKS CONTAINING ALCOHOL DO YOU HAVE ON A TYPICAL DAY WHEN YOU ARE DRINKING: 1 OR 2
HOW OFTEN DO YOU ATTENT MEETINGS OF THE CLUB OR ORGANIZATION YOU BELONG TO?: NEVER
IN A TYPICAL WEEK, HOW MANY TIMES DO YOU TALK ON THE PHONE WITH FAMILY, FRIENDS, OR NEIGHBORS?: THREE TIMES A WEEK
WITHIN THE PAST 12 MONTHS, YOU WORRIED THAT YOUR FOOD WOULD RUN OUT BEFORE YOU GOT THE MONEY TO BUY MORE: NEVER TRUE
HOW OFTEN DO YOU HAVE A DRINK CONTAINING ALCOHOL: MONTHLY OR LESS
HOW OFTEN DO YOU GET TOGETHER WITH FRIENDS OR RELATIVES?: THREE TIMES A WEEK
HOW OFTEN DO YOU ATTEND CHURCH OR RELIGIOUS SERVICES?: NEVER
IN A TYPICAL WEEK, HOW MANY TIMES DO YOU TALK ON THE PHONE WITH FAMILY, FRIENDS, OR NEIGHBORS?: THREE TIMES A WEEK
DO YOU BELONG TO ANY CLUBS OR ORGANIZATIONS SUCH AS CHURCH GROUPS UNIONS, FRATERNAL OR ATHLETIC GROUPS, OR SCHOOL GROUPS?: NO
HOW OFTEN DO YOU ATTENT MEETINGS OF THE CLUB OR ORGANIZATION YOU BELONG TO?: NEVER

## 2024-05-16 ASSESSMENT — LIFESTYLE VARIABLES
HOW OFTEN DO YOU HAVE A DRINK CONTAINING ALCOHOL: MONTHLY OR LESS
AUDIT-C TOTAL SCORE: 3
HOW OFTEN DO YOU HAVE SIX OR MORE DRINKS ON ONE OCCASION: MONTHLY
SKIP TO QUESTIONS 9-10: 0
HOW MANY STANDARD DRINKS CONTAINING ALCOHOL DO YOU HAVE ON A TYPICAL DAY: 1 OR 2

## 2024-05-17 ENCOUNTER — OFFICE VISIT (OUTPATIENT)
Dept: MEDICAL GROUP | Facility: CLINIC | Age: 34
End: 2024-05-17
Payer: COMMERCIAL

## 2024-05-17 VITALS
TEMPERATURE: 98 F | OXYGEN SATURATION: 97 % | WEIGHT: 180 LBS | DIASTOLIC BLOOD PRESSURE: 76 MMHG | HEIGHT: 72 IN | SYSTOLIC BLOOD PRESSURE: 120 MMHG | HEART RATE: 76 BPM | BODY MASS INDEX: 24.38 KG/M2

## 2024-05-17 DIAGNOSIS — R07.81 PLEURITIC CHEST PAIN: ICD-10-CM

## 2024-05-17 PROCEDURE — 99213 OFFICE O/P EST LOW 20 MIN: CPT | Mod: GE

## 2024-05-17 ASSESSMENT — FIBROSIS 4 INDEX: FIB4 SCORE: 0.75

## 2024-05-17 NOTE — PROGRESS NOTES
This note is formatted in an APSO format, for additional subjective and objective evaluation please scroll to the bottom of the note.    CC:  Chief Complaint   Patient presents with    Establish Care     Establish care. Went to the ER and both legs are numb       Assessment/Plan:  Problem List Items Addressed This Visit       Pleuritic chest pain     Pain with deep breaths, started about a month ago at the same time as his functional neurological symptoms.  Relieved for a few seconds after taking inhaler, although he is not sure which inhaler is.  Negative for shortness of breath, patient denies any other chest pain.  Recent hospitalization shows negative chest x-ray, labs are reassuring.  Vitals reassuring.  Physical exam normal except for pain on deep inspiration. No recent illness.  PERC=0. Unlikely PE. Possibly functional based on other symptoms and symptom onset but unclear, we can start with nsaids and pt.  - PT  - Trial of mobic  - f/u in a month to assess for improvement           No orders of the defined types were placed in this encounter.      No follow-ups on file.    Occasional etoh    LABS: Results reviewed and discussed with the patient, questions answered.    HISTORY OF PRESENT ILLNESS: Patient is a 33 y.o. male established patient who presents today with:    Problem   Pleuritic Chest Pain       1. Pleuritic chest pain            Patient Active Problem List    Diagnosis Date Noted    Pleuritic chest pain 05/17/2024    Functional neurological symptom disorder with mixed symptoms 04/10/2024    Weakness 04/09/2024    Complaints of weakness of lower extremity 04/09/2024    Alkaline phosphatase elevation 04/09/2024    Hearing loss secondary to cerumen impaction, right 03/23/2023        Exam:    /76 (BP Location: Right arm, Patient Position: Sitting, BP Cuff Size: Adult)   Pulse 76   Temp 36.7 °C (98 °F) (Temporal)   Ht 1.829 m (6')   Wt 81.6 kg (180 lb)   SpO2 97%   BMI 24.41 kg/m²  Body mass  index is 24.41 kg/m².    Gen: Well appearing. No apparent distress. Well developed. Sitting comfortably on exam table  HEENT: NCAT, MMM  Neck: Supple, FROM  Chest: No deformities, Equal chest expansion  Lungs: Normal effort, CTA bilaterally. No w/r/r. Wincing when taking a deep breath at peak of breath.  CV: Regular rate and rhythm. Pulse palpable. No murmur  Abd: Non-distended.  Ext: No cyanosis. No edema.  Skin: Warm/dry. No visible rashes.  Neuro: Non-focal. A&Ox4.  Psych: Normal behavior, normal affect      Tapan Pavon MD  [facility] Resident

## 2024-05-17 NOTE — ASSESSMENT & PLAN NOTE
Pain with deep breaths, started about a month ago at the same time as his functional neurological symptoms.  Relieved for a few seconds after taking inhaler, although he is not sure which inhaler is.  Negative for shortness of breath, patient denies any other chest pain.  Recent hospitalization shows negative chest x-ray, labs are reassuring.  Vitals reassuring.  Physical exam normal except for pain on deep inspiration. No recent illness.  PERC=0. Unlikely PE. Possibly functional based on other symptoms and symptom onset but unclear, we can start with nsaids and pt.  - PT  - Trial of mobic  - f/u in a month to assess for improvement

## 2024-05-20 ENCOUNTER — TELEPHONE (OUTPATIENT)
Dept: MEDICAL GROUP | Facility: CLINIC | Age: 34
End: 2024-05-20

## 2024-05-20 NOTE — TELEPHONE ENCOUNTER
VOICEMAIL  1. Caller Name: Delfino Swartz                       Call Back Number: [phone]     2. Message: Medication was suppose to be sent into [facility] on [address]. There's no medication at the pharmacy.     3. Patient approves office to leave a detailed voicemail/MyChart message: N\A

## 2024-05-21 RX ORDER — MELOXICAM 7.5 MG/1
7.5 TABLET ORAL DAILY
Qty: 30 TABLET | Refills: 0 | Status: SHIPPED | OUTPATIENT
Start: 2024-05-21 | End: 2024-06-20

## 2024-06-21 ENCOUNTER — APPOINTMENT (OUTPATIENT)
Dept: MEDICAL GROUP | Facility: CLINIC | Age: 34
End: 2024-06-21
Payer: COMMERCIAL

## 2024-08-05 ENCOUNTER — OFFICE VISIT (OUTPATIENT)
Dept: NEUROLOGY | Facility: MEDICAL CENTER | Age: 34
End: 2024-08-05
Attending: PSYCHIATRY & NEUROLOGY
Payer: COMMERCIAL

## 2024-08-05 VITALS
HEIGHT: 72 IN | WEIGHT: 174.16 LBS | SYSTOLIC BLOOD PRESSURE: 114 MMHG | OXYGEN SATURATION: 97 % | DIASTOLIC BLOOD PRESSURE: 78 MMHG | BODY MASS INDEX: 23.59 KG/M2 | TEMPERATURE: 98.6 F | HEART RATE: 71 BPM | RESPIRATION RATE: 16 BRPM

## 2024-08-05 DIAGNOSIS — R53.1 WEAKNESS: ICD-10-CM

## 2024-08-05 DIAGNOSIS — R20.2 NUMBNESS AND TINGLING OF RIGHT ARM: ICD-10-CM

## 2024-08-05 DIAGNOSIS — R20.0 NUMBNESS AND TINGLING OF RIGHT ARM: ICD-10-CM

## 2024-08-05 PROCEDURE — 3078F DIAST BP <80 MM HG: CPT | Performed by: PSYCHIATRY & NEUROLOGY

## 2024-08-05 PROCEDURE — 99211 OFF/OP EST MAY X REQ PHY/QHP: CPT | Performed by: PSYCHIATRY & NEUROLOGY

## 2024-08-05 PROCEDURE — 99417 PROLNG OP E/M EACH 15 MIN: CPT | Performed by: PSYCHIATRY & NEUROLOGY

## 2024-08-05 PROCEDURE — 99215 OFFICE O/P EST HI 40 MIN: CPT | Performed by: PSYCHIATRY & NEUROLOGY

## 2024-08-05 PROCEDURE — 3074F SYST BP LT 130 MM HG: CPT | Performed by: PSYCHIATRY & NEUROLOGY

## 2024-08-05 ASSESSMENT — ENCOUNTER SYMPTOMS
TREMORS: 0
BACK PAIN: 0
FALLS: 0
COUGH: 0
HEADACHES: 1
TINGLING: 0
MYALGIAS: 1
MEMORY LOSS: 0
SHORTNESS OF BREATH: 1
DIZZINESS: 1
GASTROINTESTINAL NEGATIVE: 1

## 2024-08-05 ASSESSMENT — FIBROSIS 4 INDEX: FIB4 SCORE: 0.75

## 2024-08-05 NOTE — PROGRESS NOTES
Subjective     Chris Swartz is a 33 y.o. male who presents from the office of Dr. Tapan Pavon MD, for consultation, with a history of persistent and fluctuating weakness and pain involving the lower extremities, subsequently admitted and diagnosed with a functional neurologic disorder with multiple symptoms.     HPI    Mr. Swartz is a pleasant 33-year-old right-handed gentleman who symptoms started rather suddenly following a normal day at work.  He simply remembers the right upper extremity is feeling painful, then heavy with numbness.  He began to develop some chest pain.  He went to bed, awoke the following morning with more generalized pain and weakness in the lower extremities preventing him from actually walking without worsening pain.  The right arm symptoms continued.    Admitted acutely for possible CVA, workup was thorough.  MRI imaging of the brain, cervical, thoracic and lumbar spines all were unrevealing.  It was not felt that CSF study was necessary.  Blood work was unremarkable, including TSH and CPK values.  Neurologic consultation was obtained, it was not felt that this was due to underlying organic disease, he was given a diagnosis of functional neurologic disorder with multiple symptoms.    As an outpatient, psychiatric consultation was obtained on May 10, 2024.  PHQ-2 total score was 3, PHQ-9 total score 4, YAMILET-7 total score 4.  It was felt that the diagnosis of a functional neurologic disorder was appropriate.  Education about the nature of the disorder was reviewed.  Patient was encouraged to remain active as well as follow through with therapies as prescribed.    Since discharge his symptoms have been mostly localized to the legs.  Though he still has chest pain occur with deep breathing, there is even some pleurisy and tenderness, this has not progressed.  He also describes headaches with dizziness, the headaches themselves having no migrainous features.  In the legs it is mostly a  "\"numbness\" though he does not feel that there is \"the cement\" feeling he had initially, it is painful to weight-bear.  He describes it as being reluctant to put \"pressure\" on his legs because of pain.  It is the pain that limits his ability to walk and which increases the weakness that he experiences.  Interestingly, the pain involves only the distal lower extremities between the ankles and knees.  It seems to escape the thighs.  Right upper extremity symptoms have not recurred, but he also states he has difficulty relaxing both upper extremities.    All of the symptoms seem to fluctuate from day-to-day, at times simultaneously and at other occasions in sequential order, the legs bothering him first, with resolution, the headache and dizziness that ensues.  The episode can last for hours or all day depending on involvement.    Throughout this time he denies any changes in bowel or bladder control, there is no loss of feeling or persistent tingling sensations in any of the extremities, he denies visual loss or diplopia, cranial nerve abnormalities, falls, tremor, or cognitive impairment.    Medical, surgical and family histories are reviewed.  There is no medical history of significance.  He denies any history of migraine, diabetes, malignancy, autoimmune disease, psychiatric disease, liver or kidney disease, blood dyscrasia, or pulmonary disease.  There is no surgical history of note.    His mother suffers from breast cancer, his father diabetes.  Evidently his father suffered from a similar symptom complex when he was a child though this seems to have been outgrown.  Both his brothers are alive and well.    He does not smoke or drink.  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  He is on Mobic as needed.    Review of Systems   Constitutional:  Negative for malaise/fatigue.   Respiratory:  Positive for shortness of breath. Negative for cough.    Gastrointestinal: Negative.    Genitourinary: Negative.    Musculoskeletal:  " Positive for joint pain and myalgias. Negative for back pain and falls.   Neurological:  Positive for dizziness and headaches. Negative for tingling and tremors.   Psychiatric/Behavioral:  Negative for memory loss.    All other systems reviewed and are negative.    Objective     /78 (BP Location: Right arm, Patient Position: Sitting, BP Cuff Size: Adult)   Pulse 71   Temp 37 °C (98.6 °F) (Temporal)   Resp 16   Ht 1.829 m (6')   Wt 79 kg (174 lb 2.6 oz)   SpO2 97%   BMI 23.62 kg/m²      Physical Exam    He appears in no acute distress.  Vital signs are stable.  There is no malar rash, jaw or temporal tenderness, jaw claudication, or allodynia.  The neck is supple, range of motion is full.  Compression maneuvers are negative.  Lhermitte phenomena is absent.  Cardiac evaluation reveals a regular rhythm.  Straight leg raising is negative bilaterally.  There is no tenderness at the wrist or elbows.  Distal pulses are intact in all 4 extremities.  There is no rash.     Neurological Exam    Fully oriented, there is no aphasia, apraxia, or inattention.    PERRLA/EOMI, visual fields are full to finger counting on confrontation bilaterally.  There is no ptosis.  Facial movements are symmetric, there is no bulbar dysfunction.  The tongue and uvula are midline.  Jaw jerk is absent.  Jaw movements are intact.  Shoulder shrug and head rotation are normal.  There is no weakness of neck flexion or extension.    Musculoskeletal exam reveals no tremor, asterixis, or drift.  There is poor relaxation voluntarily so tone is difficult to assess though with distraction, there is no clear rigidity and certainly no spasticity when he is relaxed.  Strength is 5/5 in all muscle groups.  There may be a slight antalgic give-leg weakness with grasp.    Reflexes are brisk and present at all points without asymmetries, none are dropped, there are no pathologic reflexes.  The toes are downgoing bilaterally.    He has an unusual gait,  almost with an antalgic quality, walking a bit flat-footed and an unsteady fashion.  Stride length is shortened, he does not shuffle.  He can heel and toe walk.  When walking tandem this tenderness in his feet and atypical quality to gait seem to dissipate.  When he was passively seen to walk down the hallway, there was no distortion whatsoever with his gait.  There is no appendicular dystaxia throughout.  Fine motor control is normal in all 4 extremities.    Sensory exam is intact to pinprick, temperature, JPS and vibration.  Romberg is absent.    Assessment & Plan     1. Weakness, Numbness and tingling of right arm  I agree that in all likelihood we will rule out true underlying neurologic organic disease as a cause for this gentleman symptoms.  To be thorough, EMG/NCV studies should be done to make sure we are not missing a peripheral neuropathic disease.  He lacks a history of typical risk, the exam itself is benign despite his symptoms persistence over the last several months.  At the same time, the fluctuation of symptoms with resolution and then recurrence, but he speaks a more benign process, as they have not progressed.    We spent some time talking about the nature of this condition and why it is always important to rule out underlying neurologic disease.  He was told that this is the one final diagnostic test that might prove beneficial.  I doubt any type of inflammatory disease, other CNS or PNS, and thus CSF studies are not likely going to prove more beneficial and enlightening when it comes to diagnosis.  He was told he will need to follow-up with his PCP, symptomatic relief will likely be all that is required for him moving forwards.  I do not think additional neurologic follow-up is required, assuming EMG/NCV studies are unrevealing.  We will contact him with the results.    - Referral to Neurodiagnostics (EEG,EP,EMG/NCS/DBS)    Time: 60 minutes in total spent on patient care including pre-charting,  record review, discussion with healthcare staff and documentation.  This includes face-to-face time for exam, review, discussion, as well as counseling and coordinating care.